# Patient Record
Sex: FEMALE | Race: OTHER
[De-identification: names, ages, dates, MRNs, and addresses within clinical notes are randomized per-mention and may not be internally consistent; named-entity substitution may affect disease eponyms.]

---

## 2017-06-08 NOTE — PDOC
*Physical Exam





- Vital Signs


 Last Vital Signs











Temp Pulse Resp BP Pulse Ox


 


 98.0 F   103 H  20   133/82   100 


 


 06/08/17 21:57  06/08/17 21:57  06/08/17 21:57  06/08/17 21:57  06/08/17 21:57














ED Treatment Course





- ADDITIONAL ORDERS


Additional order review: 


 Laboratory  Results











  06/08/17





  22:10


 


Urine Color  Straw


 


Urine Appearance  Clear


 


Urine pH  6.0


 


Urine Protein  Negative


 


Urine Glucose (UA)  3+ H


 


Urine Ketones  Negative


 


Urine Blood  Negative


 


Urine Nitrite  Negative


 


Urine Bilirubin  Negative


 


Urine Urobilinogen  Negative


 


Ur Leukocyte Esterase  Negative


 


Urine HCG, Qual  Negative














- Medications


Given in the ED: 


ED Medications














Discontinued Medications














Generic Name Dose Route Start Last Admin





  Trade Name Freq  PRN Reason Stop Dose Admin


 


Albuterol/Ipratropium  1 amp 06/08/17 22:23 06/08/17 22:39





  Duoneb -  NEB 06/08/17 22:24  1 amp





  ONCE ONE   Administration


 


Albuterol/Ipratropium  1 amp 06/08/17 23:23 06/08/17 23:25





  Duoneb -  NEB 06/08/17 23:24  1 amp





  ONCE ONE   Administration














Medical Decision Making





- Medical Decision Making





06/08/17 23:32


agree with care from WALTER Machado





*DC/Admit/Observation/Transfer


Diagnosis at time of Disposition: 


 Bronchitis, Asthma exacerbation





- Discharge Dispostion


Disposition: HOME





- Prescriptions


Prescriptions: 


Nebulizer [Aeroeclipse II] 1 each MC Q6H PRN #1 each


 PRN Reason: Cough


Azithromycin 250 mg PO DAILY #4 tablet


Prednisone [Deltasone] 40 mg PO DAILY #6 tablet


Albuterol 0.083% Nebulizer Sol [Ventolin 0.083% Nebulizer Soln -] 1 neb NEB Q6H 

PRN #30 vial


 PRN Reason: Cough





- Referrals


Referrals: 


STAFF,NOT ON [Primary Care Provider] - 





- Patient Instructions


Printed Discharge Instructions:  DI for Acute Bronchitis


Additional Instructions: 


take azithromycin as prescribed





continue albuterol every 6 hours as needed for cough 





take prednisone as prescribed. 





follow up Dr. capone as soon as possible





return to the ER if symptoms worsen. 








- Post Discharge Activity


Work/School Note:  Back to Work

## 2017-06-08 NOTE — PDOC
History of Present Illness





- General


Chief Complaint: Cold Symptoms


Stated Complaint: RESPIRATORY


Time Seen by Provider: 06/08/17 22:11


History Source: Patient





- History of Present Illness


Initial Comments: 





06/08/17 22:24


21 year old female with Cough 3 days and chest pain worse with breathing. 

Patient reports feeling chills has not checked temperature at home. Patient has 

a history of type 1 diabetes, Graves' disease and asthma.





Past History





- Past Medical History


Allergies/Adverse Reactions: 


 Allergies











Allergy/AdvReac Type Severity Reaction Status Date / Time


 


No Known Allergies Allergy   Verified 06/08/17 21:56











Home Medications: 


Ambulatory Orders





Insulin Pump/Infus. Set/Meter [Accu-Chek Combo System] 1 each MC ASDIR 06/08/17 


Levothyroxine [Synthroid -] 125 mcg PO DAILY 06/08/17 


Albuterol 0.083% Nebulizer Sol [Ventolin 0.083% Nebulizer Soln -] 1 neb NEB Q6H 

PRN #30 vial 06/09/17 


Azithromycin 250 mg PO DAILY #4 tablet 06/09/17 


Nebulizer [Aeroeclipse II] 1 each MC Q6H PRN #1 each 06/09/17 


Prednisone [Deltasone] 40 mg PO DAILY #6 tablet 06/09/17 








Diabetes: Yes


Thyroid Disease: Yes (Hypothyroid)





- Psycho/Social/Smoking Cessation Hx


Anxiety: No


Suicidal Ideation: No


Smoking History: Never smoked


Information on smoking cessation initiated: No


Hx Alcohol Use: No


Drug/Substance Use Hx: No


Substance Use Type: None





**Review of Systems





- Review of Systems


Able to Perform ROS?: Yes


Is the patient limited English proficient: No


Constitutional: Yes: Chills


HEENTM: Yes: Nose Congestion.  No: Symptoms Reported, See HPI, Eye Pain, 

Blurred Vision, Tearing, Recent change in vision, Double Vision, Cataracts, Ear 

Pain, Ocular Prothesis, Ear Discharge, Nose Pain, Tinnitus, Nose Bleeding, 

Hearing Loss, Throat Pain, Throat Swelling, Mouth Pain, Dental Problems, 

Difficulty Swallowing, Mouth Swelling, Other


Respiratory: Yes: Cough, Wheezing, Productive cough.  No: Symptoms reported, 

See HPI, Orthopnea, Shortness of Breath, SOB with Exertion, SOB at Rest, Stridor

, Hemoptysis, Other


Neurological: No: Symptoms reported, See HPI, Headache, Numbness, Paresthesia, 

Pre-Existing Deficit, Seizure, Tingling, Tremors, Weakness, Unsteady Gait, 

Ataxia, Dizziness, Other





*Physical Exam





- Vital Signs


 Last Vital Signs











Temp Pulse Resp BP Pulse Ox


 


 98.0 F   103 H  20   133/82   100 


 


 06/08/17 21:57  06/08/17 21:57  06/08/17 21:57  06/08/17 21:57  06/08/17 21:57














- Physical Exam


General Appearance: Yes: Appropriately Dressed (O)


HEENT: positive: Normal ENT Inspection, Other (exophthalmus)


Respiratory/Chest: positive: Rhonchi, Other (coarse breath sounds/ decreased 

breath sounds).  negative: Chest Tender, Lungs Clear, Normal Breath Sounds, 

Respiratory Distress, Accessory Muscle Use, Labored Respiration, Rapid RR, 

Decreased Breath Sounds, Paradoxal Breathing, Crackles, Rales, Stridor, Wheezing

, Hyperresonant, Dullness, Plerual Rub


Cardiovascular: positive: Regular Rhythm, Tachycardia


Gastrointestinal/Abdominal: positive: Normal Bowel Sounds, Soft


Integumentary: positive: Dry, Warm


Neurologic: positive: Fully Oriented, Alert





ED Treatment Course





- RADIOLOGY


Chest X-Ray Result: No Infiltrates





Progress Note





- Progress Note


Progress Note: 


A: RAD vs Penumonia








P: chest xray








duoneb








prednisone








azithromycin





Medical Decision Making





- Medical Decision Making





06/09/17 00:49


patient reports feeling better. encouraged to continue  albuterol As needed for 

cough and wheezing. 





Strict return precautions reviewed with patient. Patient is stable for 

discharge will DC home to have close follow-up with PMD.





*DC/Admit/Observation/Transfer


Diagnosis at time of Disposition: 


 Bronchitis, Asthma exacerbation





- Discharge Dispostion


Disposition: HOME





- Prescriptions


Prescriptions: 


Nebulizer [Aeroeclipse II] 1 each MC Q6H PRN #1 each


 PRN Reason: Cough


Azithromycin 250 mg PO DAILY #4 tablet


Prednisone [Deltasone] 40 mg PO DAILY #6 tablet


Albuterol 0.083% Nebulizer Sol [Ventolin 0.083% Nebulizer Soln -] 1 neb NEB Q6H 

PRN #30 vial


 PRN Reason: Cough





- Referrals


Referrals: 


STAFF,NOT ON [Primary Care Provider] - 





- Patient Instructions


Printed Discharge Instructions:  DI for Acute Bronchitis


Additional Instructions: 


take azithromycin as prescribed





continue albuterol every 6 hours as needed for cough 





take prednisone as prescribed. 





follow up Dr. capone as soon as possible





return to the ER if symptoms worsen. 








- Post Discharge Activity


Work/School Note:  Back to Work

## 2017-09-05 NOTE — PDOC
History of Present Illness





- General


Chief Complaint: Pain


Stated Complaint: LEG INJURY


Time Seen by Provider: 09/05/17 18:44


History Source: Patient


Exam Limitations: No Limitations





- History of Present Illness


Initial Comments: 


09/05/17 19:23


torquihng injury to right lower extremity- caught leg between train and 

platform. Was seen at urgent care, states x-ray was negative for fracture but 

states pain has persisted and in fact feels is mildly worsened. Denies numbness 

or tingling to foot but states pain is severe at site of injury





09/05/17 22:43





Occurred: reports: last week


Severity: reports: mild


Pain Location: reports: lower extremity (right leg)


Method of Injury: Yes: fall


Modifying Factors: improves with: cold therapy


Loss of Consciousness: no loss of consciousness


Associated Symptoms (Fall): denies symptoms





Past History





- Travel


Traveled outside of the country in the last 30 days: No


Close contact w/someone who was outside of country & ill: No





- Past Medical History


Allergies/Adverse Reactions: 


 Allergies











Allergy/AdvReac Type Severity Reaction Status Date / Time


 


No Known Allergies Allergy   Verified 09/05/17 18:40











Home Medications: 


Ambulatory Orders





Insulin Pump/Infus. Set/Meter [Accu-Chek Combo System] 1 each MC ASDIR 06/08/17 


Levothyroxine [Synthroid -] 125 mcg PO DAILY 06/08/17 


Albuterol 0.083% Nebulizer Sol [Ventolin 0.083% Nebulizer Soln -] 1 neb NEB Q6H 

PRN #30 vial 06/09/17 


Nebulizer [Aeroeclipse II] 1 each MC Q6H PRN #1 each 06/09/17 


Bupropion HCl [Bupropion Xl] 150 mg PO ASDIR 09/05/17 


Hydroxyzine HCl 10 mg PO ASDIR 09/05/17 








Diabetes: Yes


Psychiatric Problems:  (depression)


Thyroid Disease: Yes (Hypothyroid, graves disde)





- Psycho/Social/Smoking Cessation Hx


Anxiety: No


Suicidal Ideation: No


Smoking History: Never smoked


Information on smoking cessation initiated: No


Hx Alcohol Use: No


Drug/Substance Use Hx: No


Substance Use Type: None





**Review of Systems





- Review of Systems


Able to Perform ROS?: Yes


Is the patient limited English proficient: Yes


Constitutional: Yes: Symptoms Reported, See HPI, Malaise


HEENTM: No: Symptoms Reported


Musculoskeletal: Yes: Symptoms Reported, See HPI, Joint Swelling, Joint 

Stiffness


Integumentary: Yes: Symptoms Reported, See HPI, Bruising


Neurological: No: Symptoms reported


All Other Systems: Reviewed and Negative





*Physical Exam





- Vital Signs


 Last Vital Signs











Temp Pulse Resp BP Pulse Ox


 


 98.1 F   98 H  18   103/76   99 


 


 09/05/17 18:38  09/05/17 18:38  09/05/17 18:38  09/05/17 18:38  09/05/17 18:38














- Physical Exam


General Appearance: Yes: Nourished, Appropriately Dressed, Apparent Distress


HEENT: positive: BLAKE, Normal ENT Inspection, TMs Normal, Pharynx Normal


Neck: positive: Supple.  negative: Tender


Respiratory/Chest: positive: Lungs Clear


Musculoskeletal: positive: Normal Inspection, Decreased Range of Motion


Extremity: positive: Normal Capillary Refill, Tender, Swelling (healing 

ecchymosis).  negative: Normal Range of Motion (limitied due to swelling and 

pain )


Integumentary: positive: Normal Color, Dry, Warm


Neurologic: positive: CNs II-XII NML intact, Fully Oriented, Alert, Normal Mood/

Affect, Normal Response, Motor Strength 5/5





Progress Note





- Progress Note


Progress Note: 


CT negative for fractures dislocation, Ace wrap applied, given crutches and 

encouraged to avoid strenuous activity or weightbearing until hematomas 

resolved. Orth O as needed





*DC/Admit/Observation/Transfer


Diagnosis at time of Disposition: 


Contusion


Qualifiers:


 Encounter type: initial encounter Contusion area: lower leg Laterality: left 

Qualified Code(s): S80.12XA - Contusion of left lower leg, initial encounter





- Discharge Dispostion


Disposition: HOME


Condition at time of disposition: Stable


Admit: No





- Referrals


Referrals: 


STAFF,NOT ON [Primary Care Provider] - 


Justino Matias MD [Staff Physician] - 





- Patient Instructions


Printed Discharge Instructions:  DI for Contusion


Additional Instructions: 


Rest, ice to area on and off for 15 minutes 4-6 times a day


Avoid heavy lifting or exercise until pain and swelling is resolved or until 

further directed


Keep area highly elevated to reduce swelling


Use splints/Ace wrap as directed


Followup with orthopedist in one to 2 days if not improving, 


    if significantly improved may wait one week for followup with orthopedist





May use ibuprofen 2-200 mg tablets every 6 hours as needed for pain





- Post Discharge Activity


Work/School Note:  Back to Work

## 2018-04-30 ENCOUNTER — APPOINTMENT (OUTPATIENT)
Dept: ENDOCRINOLOGY | Facility: CLINIC | Age: 23
End: 2018-04-30

## 2018-05-09 ENCOUNTER — HOSPITAL ENCOUNTER (EMERGENCY)
Dept: HOSPITAL 74 - JER | Age: 23
Discharge: HOME | End: 2018-05-09
Payer: COMMERCIAL

## 2018-05-09 VITALS — TEMPERATURE: 98.3 F | HEART RATE: 83 BPM | SYSTOLIC BLOOD PRESSURE: 131 MMHG | DIASTOLIC BLOOD PRESSURE: 77 MMHG

## 2018-05-09 VITALS — BODY MASS INDEX: 26.9 KG/M2

## 2018-05-09 DIAGNOSIS — F32.9: ICD-10-CM

## 2018-05-09 DIAGNOSIS — E10.9: ICD-10-CM

## 2018-05-09 DIAGNOSIS — R63.4: ICD-10-CM

## 2018-05-09 DIAGNOSIS — E05.00: ICD-10-CM

## 2018-05-09 DIAGNOSIS — Z79.4: ICD-10-CM

## 2018-05-09 DIAGNOSIS — E89.0: ICD-10-CM

## 2018-05-09 DIAGNOSIS — Z96.41: ICD-10-CM

## 2018-05-09 DIAGNOSIS — R10.13: Primary | ICD-10-CM

## 2018-05-09 LAB
ALBUMIN SERPL-MCNC: 4.1 G/DL (ref 3.4–5)
ALP SERPL-CCNC: 57 U/L (ref 45–117)
ALT SERPL-CCNC: 12 U/L (ref 12–78)
ANION GAP SERPL CALC-SCNC: 7 MMOL/L (ref 8–16)
ANISOCYTOSIS BLD QL: (no result)
APPEARANCE UR: CLEAR
AST SERPL-CCNC: 9 U/L (ref 15–37)
BASOPHILS # BLD: 0.4 % (ref 0–2)
BILIRUB SERPL-MCNC: 0.5 MG/DL (ref 0.2–1)
BILIRUB UR STRIP.AUTO-MCNC: NEGATIVE MG/DL
BUN SERPL-MCNC: 11 MG/DL (ref 7–18)
CALCIUM SERPL-MCNC: 8.5 MG/DL (ref 8.5–10.1)
CHLORIDE SERPL-SCNC: 102 MMOL/L (ref 98–107)
CO2 SERPL-SCNC: 25 MMOL/L (ref 21–32)
COLOR UR: (no result)
CREAT SERPL-MCNC: 0.7 MG/DL (ref 0.55–1.02)
DEPRECATED RDW RBC AUTO: 14.8 % (ref 11.6–15.6)
EOSINOPHIL # BLD: 0.7 % (ref 0–4.5)
EPITH CASTS URNS QL MICRO: (no result) /HPF
GLUCOSE SERPL-MCNC: 375 MG/DL (ref 74–106)
HCT VFR BLD CALC: 36.3 % (ref 32.4–45.2)
HGB BLD-MCNC: 11.5 GM/DL (ref 10.7–15.3)
KETONES UR QL STRIP: (no result)
LEUKOCYTE ESTERASE UR QL STRIP.AUTO: (no result)
LIPASE SERPL-CCNC: 65 U/L (ref 73–393)
LYMPHOCYTES # BLD: 24.1 % (ref 8–40)
MACROCYTES BLD QL: (no result)
MCH RBC QN AUTO: 20.1 PG (ref 25.7–33.7)
MCHC RBC AUTO-ENTMCNC: 31.5 G/DL (ref 32–36)
MCV RBC: 63.6 FL (ref 80–96)
MONOCYTES # BLD AUTO: 7.8 % (ref 3.8–10.2)
NEUTROPHILS # BLD: 67 % (ref 42.8–82.8)
NITRITE UR QL STRIP: NEGATIVE
PH UR: 6 [PH] (ref 5–8)
PLATELET # BLD AUTO: 225 K/MM3 (ref 134–434)
PLATELET BLD QL SMEAR: ADEQUATE
PMV BLD: 9.8 FL (ref 7.5–11.1)
POTASSIUM SERPLBLD-SCNC: 4.1 MMOL/L (ref 3.5–5.1)
PROT SERPL-MCNC: 7.5 G/DL (ref 6.4–8.2)
PROT UR QL STRIP: (no result)
PROT UR QL STRIP: NEGATIVE
RBC # BLD AUTO: 5.71 M/MM3 (ref 3.6–5.2)
RBC MORPH BLD: YES
SODIUM SERPL-SCNC: 134 MMOL/L (ref 136–145)
SP GR UR: 1.03 (ref 1–1.03)
UROBILINOGEN UR STRIP-MCNC: NEGATIVE MG/DL (ref 0.2–1)
WBC # BLD AUTO: 7.9 K/MM3 (ref 4–10)

## 2018-05-09 NOTE — PDOC
Rapid Medical Evaluation


Time Seen by Provider: 05/09/18 16:02


Medical Evaluation: 


 Allergies











Allergy/AdvReac Type Severity Reaction Status Date / Time


 


No Known Allergies Allergy   Verified 05/09/18 16:01











05/09/18 16:02


I have performed a brief in-person evaluation of this patient.





The patient presents with a chief complaint of: upper abd pain w/ n/v x several 

months, getting worse now. H/o IDDM, Graves disease, s/p surgery, on 

levothyroxine





Pertinent physical exam findings:well maylin w/ ttp to epigastrium





I have ordered the following:labs





The patient will proceed to the ED for further evaluation.





**Discharge Disposition





- Diagnosis


Abdominal pain


Qualifiers:


 Abdominal location: epigastric Qualified Code(s): R10.13 - Epigastric pain








- Referrals


Referrals: 


Ramona Mcallister MD [Primary Care Provider] - 





- Patient Instructions





- Post Discharge Activity

## 2018-05-09 NOTE — PDOC
History of Present Illness





- General


History Source: Patient


Exam Limitations: No Limitations





- History of Present Illness


Initial Comments: 





05/09/18 18:28


The patient is a 22 year old female with past medical history of IDDM and 

Graves disease s/p thyroidectomy who presents to the ED with complaints of 

abdominal pain with nausea,  vomiting, and diarrhea since December. The patient 

states she experiences a centralized abdominal pain almost daily, that can 

occur either before or after she eats. She is unable to notice any pattern with 

her pain in regards to what she is eating. She states the pain is always 

associated with nausea and intermittently with vomiting and diarrhea. The 

patient reports that since yesterday she has had multiple episodes of nonbloody/

nonbilious emesis and diarrhea. She also reports having a 9 lb weight loss 

since this these symptoms began as well because she hasnt been eating as much 

to avoid pain. She denies any blood in her stool or urine. Denies any recent 

illness, fevers or chills. She reports she has an appointment with a GI 

specialist at the end of June. She reports her synthroid medication was changed 

two months ago. 








<Theodora Hoffman - Last Filed: 05/09/18 19:48>





<Annemarie Vallecillo - Last Filed: 05/09/18 20:33>





- General


Chief Complaint: Pain, Acute


Stated Complaint: ABD PAIN, NAUSEA


Time Seen by Provider: 05/09/18 16:02





Past History





<Theodora Hoffman - Last Filed: 05/09/18 19:48>





- Past Medical History


COPD: No


Diabetes: Yes (TYPE 1)


Psychiatric Problems:  (depression)


Thyroid Disease: Yes (Hypothyroid)





- Suicide/Smoking/Psychosocial Hx


Smoking History: Never smoked


Hx Alcohol Use: No


Drug/Substance Use Hx: No


Substance Use Type: None





<Annemarie Vallecillo - Last Filed: 05/09/18 20:33>





- Past Medical History


Allergies/Adverse Reactions: 


 Allergies











Allergy/AdvReac Type Severity Reaction Status Date / Time


 


No Known Allergies Allergy   Verified 05/09/18 16:01











Home Medications: 


Ambulatory Orders





Insulin Pump/Infus. Set/Meter [Accu-Chek Combo System] 1 each MC ASDIR 06/08/17 


Levothyroxine [Synthroid -] 200 mcg PO DAILY 06/08/17 











**Review of Systems





- Review of Systems


Able to Perform ROS?: Yes


Comments:: 





05/09/18 18:29


GENERAL/CONSTITUTIONAL: No fever or chills. No weakness.


HEAD, EYES, EARS, NOSE AND THROAT: No change in vision. No ear pain or 

discharge. No sore throat.


CARDIOVASCULAR: No chest pain or shortness of breath.


RESPIRATORY: No cough, wheezing, or hemoptysis.


GASTROINTESTINAL: (+) nausea, vomiting, diarrhea, abdominal pain. No 

constipation.


GENITOURINARY: No dysuria, frequency, or change in urination.


MUSCULOSKELETAL: No joint or muscle swelling or pain. No neck or back pain.


SKIN: No rash


NEUROLOGIC: No headache, vertigo, loss of consciousness, or change in strength/

sensation.


ENDOCRINE: (+) 9 lb weight loss. No increased thirst.


HEMATOLOGIC/LYMPHATIC: No anemia, easy bleeding, or history of blood clots.


ALLERGIC/IMMUNOLOGIC: No hives or skin allergy.





All Other Systems: Reviewed and Negative





<Theodora Hoffman - Last Filed: 05/09/18 19:48>





*Physical Exam





- Vital Signs


 Last Vital Signs











Temp Pulse Resp BP Pulse Ox


 


 98.5 F   82   16   123/77   99 


 


 05/09/18 16:01  05/09/18 16:01  05/09/18 16:01  05/09/18 16:01  05/09/18 16:01














- Physical Exam


Comments: 





05/09/18 18:30


GENERAL: Awake, alert, and fully oriented, in no acute distress


HEAD: No signs of trauma


EYES: PERRLA, EOMI, sclera anicteric, conjunctiva clear


ENT: Auricles normal inspection, hearing grossly normal, nares patent, 

oropharynx clear without 


exudates. Moist mucosa


NECK: Normal ROM, supple, no lymphadenopathy, JVD, or masses


LUNGS: Breath sounds equal, clear to auscultation bilaterally.  No wheezes, and 

no crackles


HEART: Regular rate and rhythm, normal S1 and S2, no murmurs, rubs or gallops


ABDOMEN: Epigastric tenderness. Soft, normoactive bowel sounds.  No guarding, 

no rebound.  No masses


EXTREMITIES: Normal range of motion, no edema.  No clubbing or cyanosis. No 

cords, erythema, or tenderness


NEUROLOGICAL: Cranial nerves II through XII grossly intact.  Normal speech, 

normal gait


SKIN: Warm, Dry, normal turgor, no rashes or lesions noted.








<Theodora Hoffman - Last Filed: 05/09/18 19:48>





- Vital Signs


 Last Vital Signs











Temp Pulse Resp BP Pulse Ox


 


 98.5 F   82   16   123/77   99 


 


 05/09/18 16:01  05/09/18 16:01  05/09/18 16:01  05/09/18 16:01  05/09/18 16:01














<Annemarie Vallecillo - Last Filed: 05/09/18 20:33>





ED Treatment Course





- LABORATORY


CBC & Chemistry Diagram: 


 05/09/18 16:34





 05/09/18 16:34





- ADDITIONAL ORDERS


Additional order review: 


 Laboratory  Results











  05/09/18 05/09/18 05/09/18





  17:07 16:34 16:34


 


Sodium   


 


Potassium   


 


Chloride   


 


Carbon Dioxide   


 


Anion Gap   


 


BUN   


 


Creatinine   


 


Creat Clearance w eGFR   


 


Random Glucose   


 


Calcium   


 


Total Bilirubin   


 


AST   


 


ALT   


 


Alkaline Phosphatase   


 


Total Protein   


 


Albumin   


 


Lipase   


 


TSH   0.01 L 


 


Serum Pregnancy, Qual    Negative


 


Urine Color  Straw  


 


Urine Appearance  Clear  


 


Urine pH  6.0  


 


Ur Specific Gravity  1.028  


 


Urine Protein  Negative  


 


Urine Glucose (UA)  3+ H  


 


Urine Ketones  Trace H  


 


Urine Blood  Negative  


 


Urine Nitrite  Negative  


 


Urine Bilirubin  Negative  


 


Urine Urobilinogen  Negative  


 


Ur Leukocyte Esterase  Trace  


 


Urine WBC (Auto)  6  


 


Urine RBC (Auto)  5  


 


Ur Epithelial Cells  Rare  














  05/09/18





  16:34


 


Sodium  134 L


 


Potassium  4.1


 


Chloride  102


 


Carbon Dioxide  25


 


Anion Gap  7 L


 


BUN  11


 


Creatinine  0.7


 


Creat Clearance w eGFR  > 60


 


Random Glucose  375 H*


 


Calcium  8.5


 


Total Bilirubin  0.5  D


 


AST  9 L


 


ALT  12


 


Alkaline Phosphatase  57


 


Total Protein  7.5


 


Albumin  4.1


 


Lipase  65 L


 


TSH 


 


Serum Pregnancy, Qual 


 


Urine Color 


 


Urine Appearance 


 


Urine pH 


 


Ur Specific Gravity 


 


Urine Protein 


 


Urine Glucose (UA) 


 


Urine Ketones 


 


Urine Blood 


 


Urine Nitrite 


 


Urine Bilirubin 


 


Urine Urobilinogen 


 


Ur Leukocyte Esterase 


 


Urine WBC (Auto) 


 


Urine RBC (Auto) 


 


Ur Epithelial Cells 








 











  05/09/18





  16:34


 


RBC  5.71 H


 


MCV  63.6 L


 


MCHC  31.5 L


 


RDW  14.8


 


MPV  9.8  D


 


Neutrophils %  67.0  D


 


Lymphocytes %  24.1  D


 


Monocytes %  7.8


 


Eosinophils %  0.7


 


Basophils %  0.4














- RADIOLOGY


Radiograph Interpretation: 





05/09/18 19:48


Abdominal ultrasound as reviewed by Dr. Atwood reports no gallstones identified. 





- Medications


Given in the ED: 


ED Medications














Discontinued Medications














Generic Name Dose Route Start Last Admin





  Trade Name Cathi  PRN Reason Stop Dose Admin


 


Al Hydroxide/Mg Hydroxide  30 ml  05/09/18 17:39  05/09/18 17:53





  Mylanta Oral Suspension -  PO  05/09/18 17:40  30 ml





  ONCE ONE   Administration


 


Ondansetron HCl  4 mg  05/09/18 17:39  05/09/18 17:53





  Zofran Odt -  SL  05/09/18 17:40  4 mg





  ONCE ONE   Administration


 


Ranitidine HCl  150 mg  05/09/18 17:39  05/09/18 17:53





  Zantac -  PO  05/09/18 17:40  150 mg





  ONCE ONE   Administration














<Theodora Hoffman - Last Filed: 05/09/18 19:48>





- LABORATORY


CBC & Chemistry Diagram: 


 05/09/18 16:34





 05/09/18 16:34





- ADDITIONAL ORDERS


Additional order review: 


 











  05/09/18





  16:34


 


RBC  5.71 H


 


MCV  63.6 L


 


MCHC  31.5 L


 


RDW  14.8


 


MPV  9.8  D


 


Neutrophils %  67.0  D


 


Lymphocytes %  24.1  D


 


Monocytes %  7.8


 


Eosinophils %  0.7


 


Basophils %  0.4














<Annemarie Vallecillo - Last Filed: 05/09/18 20:33>





Medical Decision Making





- Medical Decision Making





05/09/18 17:39


a/p: 23yo female with epigastric pain that radiates to back x 5 months


-seen at urgent care x 2, told to follow up with GI and has appt with a GI 

specialist in the [place] for the end of june


-pain sometimes assoc with eating


-has had outpt labs that are normal


-will obtain labs, GI coctail for meds, RUQ u/s to eval for poss biliary disease


-will monitor and reassess


05/09/18 20:28


ultrasound negative for gallstones


labs reviewed


pt states her thyroid meds were changed recently 


discussed decreasing her synthroid dose


05/09/18 20:30


states she uses an insulin pump to control her glucose


does not follow up with an endocrinologist


05/09/18 20:31


discussed all reasons to return to the ED and need for follow up with Gi and 

endocrinology as an outpt


answered all quesitons





<Annemarie Vallecillo - Last Filed: 05/09/18 20:33>





*DC/Admit/Observation/Transfer





- Attestations


Scribe Attestion: 





05/09/18 18:31


Documentation prepared by Theodora Hoffman, acting as medical scribe for Annemarie Vallecillo DO.





<Theodora Hoffman - Last Filed: 05/09/18 19:48>





- Discharge Dispostion


Decision to Admit order: No





- Attestations


Physician Attestion: 





05/09/18 20:33








I, Dr. Annemarie Vallecillo DO, attest that this document has been prepared under 

my direction and personally reviewed by me in its entirety.   I further attest, 

that it accurately reflects all work, treatment, procedures and medical decision

-making performed by me.  





<Annemarie Vallecillo - Last Filed: 05/09/18 20:33>


Diagnosis at time of Disposition: 


 Epigastric pain, Hyperthyroidism





Abdominal pain


Qualifiers:


 Abdominal location: epigastric Qualified Code(s): R10.13 - Epigastric pain








- Discharge Dispostion


Disposition: HOME


Condition at time of disposition: Stable





- Referrals


Referrals: 


Ramona Mcallister MD [Primary Care Provider] - 


Gerber Kruse MD [Staff Physician] - 


John Bond MD [Staff Physician] - 





- Patient Instructions


Printed Discharge Instructions:  DI for Hyperthyroidism, DI for Epigastric Pain


Additional Instructions: 


Please make an appointment with the endocrinologist and the gastroenterologist. 

Please return to the ED with any further concerns or complaints.





- Post Discharge Activity

## 2018-05-20 ENCOUNTER — HOSPITAL ENCOUNTER (EMERGENCY)
Dept: HOSPITAL 74 - JER | Age: 23
LOS: 1 days | Discharge: HOME | End: 2018-05-21
Payer: COMMERCIAL

## 2018-05-20 VITALS — DIASTOLIC BLOOD PRESSURE: 62 MMHG | SYSTOLIC BLOOD PRESSURE: 134 MMHG | TEMPERATURE: 100.1 F | HEART RATE: 122 BPM

## 2018-05-20 VITALS — BODY MASS INDEX: 27.3 KG/M2

## 2018-05-20 DIAGNOSIS — E05.00: ICD-10-CM

## 2018-05-20 DIAGNOSIS — Z96.41: ICD-10-CM

## 2018-05-20 DIAGNOSIS — E89.0: ICD-10-CM

## 2018-05-20 DIAGNOSIS — Z79.4: ICD-10-CM

## 2018-05-20 DIAGNOSIS — R10.13: Primary | ICD-10-CM

## 2018-05-20 DIAGNOSIS — E10.9: ICD-10-CM

## 2018-05-20 PROCEDURE — 3E033NZ INTRODUCTION OF ANALGESICS, HYPNOTICS, SEDATIVES INTO PERIPHERAL VEIN, PERCUTANEOUS APPROACH: ICD-10-PCS

## 2018-05-20 PROCEDURE — 3E033GC INTRODUCTION OF OTHER THERAPEUTIC SUBSTANCE INTO PERIPHERAL VEIN, PERCUTANEOUS APPROACH: ICD-10-PCS

## 2018-05-21 LAB
ALBUMIN SERPL-MCNC: 3.6 G/DL (ref 3.4–5)
ALP SERPL-CCNC: 63 U/L (ref 45–117)
ALT SERPL-CCNC: 14 U/L (ref 12–78)
ANION GAP SERPL CALC-SCNC: 9 MMOL/L (ref 8–16)
APPEARANCE UR: CLEAR
AST SERPL-CCNC: 14 U/L (ref 15–37)
BASOPHILS # BLD: 0.3 % (ref 0–2)
BILIRUB SERPL-MCNC: 0.6 MG/DL (ref 0.2–1)
BILIRUB UR STRIP.AUTO-MCNC: NEGATIVE MG/DL
BUN SERPL-MCNC: 13 MG/DL (ref 7–18)
CALCIUM SERPL-MCNC: 8 MG/DL (ref 8.5–10.1)
CHLORIDE SERPL-SCNC: 103 MMOL/L (ref 98–107)
CO2 SERPL-SCNC: 24 MMOL/L (ref 21–32)
COLOR UR: YELLOW
CREAT SERPL-MCNC: 0.7 MG/DL (ref 0.55–1.02)
DEPRECATED RDW RBC AUTO: 14.6 % (ref 11.6–15.6)
EOSINOPHIL # BLD: 0.1 % (ref 0–4.5)
EPITH CASTS URNS QL MICRO: (no result) /HPF
GLUCOSE SERPL-MCNC: 182 MG/DL (ref 74–106)
HCT VFR BLD CALC: 33.8 % (ref 32.4–45.2)
HGB BLD-MCNC: 10.5 GM/DL (ref 10.7–15.3)
HYALINE CASTS URNS QL MICRO: 1 /LPF
KETONES UR QL STRIP: (no result)
LEUKOCYTE ESTERASE UR QL STRIP.AUTO: NEGATIVE
LIPASE SERPL-CCNC: 32 U/L (ref 73–393)
LYMPHOCYTES # BLD: 18.5 % (ref 8–40)
MAGNESIUM SERPL-MCNC: 1.9 MG/DL (ref 1.8–2.4)
MCH RBC QN AUTO: 19.8 PG (ref 25.7–33.7)
MCHC RBC AUTO-ENTMCNC: 31 G/DL (ref 32–36)
MCV RBC: 63.7 FL (ref 80–96)
MONOCYTES # BLD AUTO: 9.3 % (ref 3.8–10.2)
MUCOUS THREADS URNS QL MICRO: (no result)
NEUTROPHILS # BLD: 71.8 % (ref 42.8–82.8)
NITRITE UR QL STRIP: NEGATIVE
PH BLDV: 7.39 [PH] (ref 7.32–7.42)
PH UR: 5 [PH] (ref 5–8)
PLATELET # BLD AUTO: 251 K/MM3 (ref 134–434)
PMV BLD: 9.9 FL (ref 7.5–11.1)
POTASSIUM SERPLBLD-SCNC: 3.9 MMOL/L (ref 3.5–5.1)
PROT SERPL-MCNC: 7.3 G/DL (ref 6.4–8.2)
PROT UR QL STRIP: (no result)
PROT UR QL STRIP: (no result)
RBC # BLD AUTO: 5.31 M/MM3 (ref 3.6–5.2)
SODIUM SERPL-SCNC: 136 MMOL/L (ref 136–145)
SP GR UR: 1.03 (ref 1–1.03)
UROBILINOGEN UR STRIP-MCNC: 2 MG/DL (ref 0.2–1)
VENOUS PC02: 39.9 MMHG (ref 38–52)
VENOUS PO2: 41.7 MMHG (ref 28–48)
WBC # BLD AUTO: 6.5 K/MM3 (ref 4–10)

## 2018-05-21 NOTE — PDOC
Attending Attestation





- Resident


Resident Name: Mg Villa





- ED Attending Attestation


I have performed the following: I have examined & evaluated the patient, The 

case was reviewed & discussed with the resident, I agree w/resident's findings 

& plan, Exceptions are as noted





- HPI


HPI: 





05/21/18 05:02


The patient is a 22 year old female, with a significant past medical history of 

Type 1 Diabetes (on insulin pump) and Graves Disease (s/p thyroidectomy on 

synthroid), who presents to the emergency department with 2 days of epigastric 

pain and PO intolerance. As per patient, she had fever tonight of 101.8 degrees 

F which prompted her to come into the ED. She is currently on Amoxicillin for a 

sinus infection for a sinus infection (x2days). She reports not being able to 

withhold food or drink for the past 2 days.  As per patient, she came into the 

ED for similar symptoms recently (05/9/18) and was given a referral for a GI 

doctor after a negative ultrasound. Reports her blood sugars have been in the 

300s which often occurs when she has an infection. Denies hx DKA. 





She denies recent headache or dizziness. She denies recent  dysuria, frequency, 

urgency or hematuria. She denies recent chest pain or shortness of breath.





Allergies: NKA 


Past surgical history: Thyroidectomy. 


Social history: Nonsmoker. Denies EtOH use and recreational drug use. 


Primary Care Physician: Dr. Mcallister








- Physicial Exam


PE: 





05/21/18 05:02


GENERAL: Awake, alert, and fully oriented, in no acute distress


HEAD: No signs of trauma


EYES: PERRLA, EOMI, sclera anicteric, conjunctiva clear. +mild proptosis


ENT: Auricles normal inspection, hearing grossly normal, nares patent, 

oropharynx clear without exudates. Moist mucosa


NECK: Normal ROM, supple, no lymphadenopathy, JVD, or masses


LUNGS: Breath sounds equal, clear to auscultation bilaterally.  No wheezes, and 

no crackles


HEART: tachy to 110 but regular, normal S1 and S2, no murmurs, rubs or gallops


ABDOMEN: Soft, +epigastric ttp, normoactive bowel sounds.  No guarding, no 

rebound.  No masses


EXTREMITIES: Normal range of motion, no edema.  No clubbing or cyanosis. No 

cords, erythema, or tenderness


BACK: No midline spinal tenderness in cervical/thoracic/lumbar region


NEUROLOGICAL:  Normal speech, cranial nerves intact, negative pronator drift, 5/

5 strength in all 4 extremities, normal sensation to light touch in all 4 

extremities, normal cerebellar exam, normal gait, normal reflexes and tone


SKIN: Warm, Dry, normal turgor, no rashes or lesions noted.








- Medical Decision Making





05/21/18 05:02


21yo F hx DM1, graves disease presents with epigastric pain, PO intolerance and 

fever to 101. VS with fever and tachycardia. Exam with epigastric ttp. Pt had 

neg US recently in ED for similar sxs. DDx includes but not limited to 

enteritis vs pancreatitis vs pyelonephritis vs cystitis vs colitis vs 

gastritis. DKA a thought, but unlikely with FS in 300s. Unlikely cholecystitis 

as US recently with no gallstones. Plan:


-labs


-UA


-UPT


-CTAP w IV contrast


-antipyretics, pain control


-reassess





05/21/18 07:03


Labs with no leukocytosis


UA neg


TSH undetectable, free T4 very mildly elevated. 


Repeat HR 92


Pt does not appear to be frankly hyperthyroid at this time


Rpt abd exam benign with minimal epigastric ttp


Pt tolerating PO, is clinically well appearing


Pt was offered CTAP given bounceback visit but declines at this time as she 

feels much better


Pt made aware that she should return ASAP if she has any return of the pain or 

any concerning symptoms for likey CTAP


She expresses understanding. 





I discussed the physical exam findings, ancillary test results and final 

diagnoses with the patient. I answered all of the patient's questions. The 

patient was satisfied with the care received and felt comfortable with the 

discharge plan and treatment plan. The patient will call their primary care 

physician within 24 hours to arrange follow-up and will return to the Emergency 

Department with any new, persistent or worsening symptoms. 





<Sherly Torres - Last Filed: 05/21/18 07:22>





Attestations





- Attestations





05/21/18 05:14





Documentation prepared by Maury Cutler, acting as medical scribe for 

Sherly Torres MD.





<Maury Cutler - Last Filed: 05/21/18 05:14>

## 2018-05-21 NOTE — PDOC
History of Present Illness





- General


Chief Complaint: Pain


Stated Complaint: VOMITING/DIARRHEA


Time Seen by Provider: 05/21/18 03:54





- History of Present Illness


Initial Comments: 





05/21/18 04:05


Ms. Myles is a 23 yo female w/ pmh of graves disease s/p thyroidectomy and 

IDDM who presents for evaluation of 2 day history of epigastric pain. She has 

previously been evaluated in this ER for similar pain 5/9/18 with negative 

abdominal US and has appointment for follow-up with GI in mid July. Ms. Myles 

reports she has also had fever for the last day despite being on ABX (

amoxicillin) for a sinus infection. ABX proscribed Saturday at outpatient 

urgent care clinic. She also endorses a 2 day history of being unable to keep 

anything down including soup or tylenol.





The patient denies chest pain, shortness of breath, headache and dizziness. 

Denies fever, chills, diarrhea and constipation. Denies dysuria, frequency, 

urgency and hematuria. 





Allergies: NKDA





05/21/18 04:27








Past History





- Past Medical History


Allergies/Adverse Reactions: 


 Allergies











Allergy/AdvReac Type Severity Reaction Status Date / Time


 


No Known Allergies Allergy   Verified 05/20/18 23:08











Home Medications: 


Ambulatory Orders





Insulin Pump/Infus. Set/Meter [Accu-Chek Combo System] 1 each [facility] ASDIR 06/08/17 


Levothyroxine [Synthroid -] 200 mcg PO DAILY 06/08/17 








COPD: No


Diabetes: Yes (TYPE 1)


Psychiatric Problems:  (depression)


Thyroid Disease: Yes (Hypothyroid)





- Reproductive History


Cervical CA: No


Dysfunctional Uterine Bleeding: No


Ectopic Pregnancy: No


Endometrial CA: No


Polycystic Ovaries: No





- Suicide/Smoking/Psychosocial Hx


Smoking History: Never smoked


Hx Alcohol Use: No


Drug/Substance Use Hx: No


Substance Use Type: None





**Review of Systems





- Review of Systems


Comments:: 





05/21/18 04:22


GENERAL/CONSTITUTIONAL: No fever or chills. No weakness.


HEAD, EYES, EARS, NOSE AND THROAT: No change in vision. No ear pain or 

discharge. No sore throat.


CARDIOVASCULAR: No chest pain or shortness of breath


RESPIRATORY: No cough, wheezing, or hemoptysis.


GASTROINTESTINAL: +N/V as described with epigastric pain. No diarrhea or 

constipation.


GENITOURINARY: No dysuria, frequency, or change in urination.


MUSCULOSKELETAL: No joint or muscle swelling or pain. No neck or back pain.


SKIN: No rash


NEUROLOGIC: No headache, vertigo, loss of consciousness, or change in strength/

sensation.


ENDOCRINE: No increased thirst. No abnormal weight change


HEMATOLOGIC/LYMPHATIC: No anemia, easy bleeding, or history of blood clots.


ALLERGIC/IMMUNOLOGIC: No hives or skin allergy.





*Physical Exam





- Vital Signs


 Last Vital Signs











Temp Pulse Resp BP Pulse Ox


 


 100.1 F H  122 H  18   134/62   97 


 


 05/20/18 23:06  05/20/18 23:06  05/20/18 23:06  05/20/18 23:06  05/20/18 23:06














- Physical Exam


Comments: 





05/21/18 04:22


GENERAL: Awake, alert, and fully oriented, in no acute distress


HEAD: No signs of trauma, normocephalic, atraumatic 


EYES: PERRLA, EOMI, sclera anicteric, conjunctiva clear


ENT: Auricles normal inspection, hearing grossly normal, nares patent, 

oropharynx clear without


exudates. Moist mucosa


NECK: Normal ROM, supple, no lymphadenopathy, JVD, or masses


LUNGS: No distress, speaks full sentences, clear to auscultation bilaterally 


HEART: Regular rate and rhythm, normal S1 and S2, no murmurs, rubs or gallops, 

peripheral pulses normal and equal bilaterally. 


ABDOMEN: +Midline epigastric TTP. Soft, normoactive bowel sounds. No guarding, 

no rebound. No masses


EXTREMITIES: Normal inspection, Normal range of motion, no edema. No clubbing 

or cyanosis. 


NEUROLOGICAL: Cranial nerves II through XII grossly intact. Normal speech, 

normal gait, no focal sensorimotor deficits 


SKIN: Warm, Dry, normal turgor, no rashes or lesions noted. 











ED Treatment Course





- LABORATORY


CBC & Chemistry Diagram: 


 05/21/18 05:50





 05/21/18 04:28





Medical Decision Making





- Medical Decision Making





05/21/18 06:41


Ms. Myles is a 23 yo female w/ pmh as described who presents for evaluation of 

abdominal pain. Due to repeat nature of patient visit, broad workup begun 

including tylenol, pepcid, zofran, and fluids for symptomatic relief.





05/21/18 06:59


Patient reporting resolution of symptoms with above treatment. Labs below 

notable for elevated T4 and low TSH. Discussed following up with 

endocrinologist for adjustment of levothyroxine. Patient verbalized 

understanding and agreement and will comply. Discharging patient with 

instructions to f/u with PCP this week for further evaluation.





 Laboratory Results - last 24 hr











  05/21/18 05/21/18 05/21/18





  04:20 04:28 04:28


 


WBC   


 


RBC   


 


Hgb   


 


Hct   


 


MCV   


 


MCH   


 


MCHC   


 


RDW   


 


Plt Count   


 


MPV   


 


Neutrophils %   


 


Lymphocytes %   


 


Monocytes %   


 


Eosinophils %   


 


Basophils %   


 


VBG pH  7.39  


 


POC VBG pCO2  39.9  


 


POC VBG pO2  41.7  


 


Mixed VBG HCO3  23.5  


 


Sodium    136


 


Potassium    3.9


 


Chloride    103


 


Carbon Dioxide    24


 


Anion Gap    9


 


BUN    13


 


Creatinine    0.7


 


Creat Clearance w eGFR    > 60


 


Random Glucose    182 H


 


Calcium    8.0 L


 


Magnesium    1.9


 


Total Bilirubin    0.6


 


AST    14 L


 


ALT    14


 


Alkaline Phosphatase    63


 


Troponin I    < 0.02


 


Total Protein    7.3


 


Albumin    3.6


 


Lipase    32 L


 


TSH    < 0.01 L


 


Free T4   


 


Urine Color   Yellow 


 


Urine Appearance   Clear 


 


Urine pH   5.0 


 


Ur Specific Gravity   1.031 


 


Urine Protein   1+ H 


 


Urine Glucose (UA)   3+ H 


 


Urine Ketones   2+ H 


 


Urine Blood   Negative 


 


Urine Nitrite   Negative 


 


Urine Bilirubin   Negative 


 


Urine Urobilinogen   2.0 H 


 


Ur Leukocyte Esterase   Negative 


 


Urine WBC (Auto)   3 


 


Urine RBC (Auto)   2 


 


Ur Epithelial Cells   Rare 


 


Hyaline Casts   1 


 


Urine Mucus   Rare 


 


Urine HCG, Qual   


 


Acetone, Qual   














  05/21/18 05/21/18 05/21/18





  04:28 04:36 04:36


 


WBC   


 


RBC   


 


Hgb   


 


Hct   


 


MCV   


 


MCH   


 


MCHC   


 


RDW   


 


Plt Count   


 


MPV   


 


Neutrophils %   


 


Lymphocytes %   


 


Monocytes %   


 


Eosinophils %   


 


Basophils %   


 


VBG pH   


 


POC VBG pCO2   


 


POC VBG pO2   


 


Mixed VBG HCO3   


 


Sodium   


 


Potassium   


 


Chloride   


 


Carbon Dioxide   


 


Anion Gap   


 


BUN   


 


Creatinine   


 


Creat Clearance w eGFR   


 


Random Glucose   


 


Calcium   


 


Magnesium   


 


Total Bilirubin   


 


AST   


 


ALT   


 


Alkaline Phosphatase   


 


Troponin I   


 


Total Protein   


 


Albumin   


 


Lipase   


 


TSH   


 


Free T4   1.74 H 


 


Urine Color   


 


Urine Appearance   


 


Urine pH   


 


Ur Specific Gravity   


 


Urine Protein   


 


Urine Glucose (UA)   


 


Urine Ketones   


 


Urine Blood   


 


Urine Nitrite   


 


Urine Bilirubin   


 


Urine Urobilinogen   


 


Ur Leukocyte Esterase   


 


Urine WBC (Auto)   


 


Urine RBC (Auto)   


 


Ur Epithelial Cells   


 


Hyaline Casts   


 


Urine Mucus   


 


Urine HCG, Qual  Negative  


 


Acetone, Qual    Trace H














  05/21/18





  05:50


 


WBC  6.5


 


RBC  5.31 H


 


Hgb  10.5 L


 


Hct  33.8


 


MCV  63.7 L


 


MCH  19.8 L


 


MCHC  31.0 L


 


RDW  14.6


 


Plt Count  251


 


MPV  9.9


 


Neutrophils %  71.8


 


Lymphocytes %  18.5  D


 


Monocytes %  9.3


 


Eosinophils %  0.1  D


 


Basophils %  0.3


 


VBG pH 


 


POC VBG pCO2 


 


POC VBG pO2 


 


Mixed VBG HCO3 


 


Sodium 


 


Potassium 


 


Chloride 


 


Carbon Dioxide 


 


Anion Gap 


 


BUN 


 


Creatinine 


 


Creat Clearance w eGFR 


 


Random Glucose 


 


Calcium 


 


Magnesium 


 


Total Bilirubin 


 


AST 


 


ALT 


 


Alkaline Phosphatase 


 


Troponin I 


 


Total Protein 


 


Albumin 


 


Lipase 


 


TSH 


 


Free T4 


 


Urine Color 


 


Urine Appearance 


 


Urine pH 


 


Ur Specific Gravity 


 


Urine Protein 


 


Urine Glucose (UA) 


 


Urine Ketones 


 


Urine Blood 


 


Urine Nitrite 


 


Urine Bilirubin 


 


Urine Urobilinogen 


 


Ur Leukocyte Esterase 


 


Urine WBC (Auto) 


 


Urine RBC (Auto) 


 


Ur Epithelial Cells 


 


Hyaline Casts 


 


Urine Mucus 


 


Urine HCG, Qual 


 


Acetone, Qual 














*DC/Admit/Observation/Transfer


Diagnosis at time of Disposition: 


Abdominal pain


Qualifiers:


 Abdominal location: unspecified location Qualified Code(s): R10.9 - 

Unspecified abdominal pain








- Discharge Dispostion


Disposition: HOME





- Referrals


Referrals: 


Ramona Mcallister MD [Primary Care Provider] - 





- Patient Instructions


Printed Discharge Instructions:  DI for Abdominal Pain-Adult


Additional Instructions: 


Please follow-up with endocrinologist and Gastroenterologist at earliest 

available appointment as discussed. Follow-up with primary care physician this 

week for further evaluation. Return to ER if any increase /return of abdominal 

pain, fever, chills, or other concerning symptoms. We hope you feel better soon.





- Post Discharge Activity

## 2018-05-21 NOTE — EKG
Test Reason : 

Blood Pressure : ***/*** mmHG

Vent. Rate : 078 BPM     Atrial Rate : 078 BPM

   P-R Int : 142 ms          QRS Dur : 082 ms

    QT Int : 400 ms       P-R-T Axes : 054 071 041 degrees

   QTc Int : 456 ms

 

NORMAL SINUS RHYTHM

NONSPECIFIC T WAVE ABNORMALITY

ABNORMAL ECG

NO PREVIOUS ECGS AVAILABLE

Confirmed by JESSICA GUAJARDO MD ([id number]) on 5/21/2018 11:40:48 PM

 

Referred By:             Confirmed By:JESSICA GUAJARDO MD

## 2019-03-12 ENCOUNTER — EMERGENCY (EMERGENCY)
Facility: HOSPITAL | Age: 24
LOS: 1 days | Discharge: HOME | End: 2019-03-12
Attending: EMERGENCY MEDICINE | Admitting: EMERGENCY MEDICINE
Payer: MEDICAID

## 2019-03-12 VITALS
HEART RATE: 68 BPM | TEMPERATURE: 98 F | SYSTOLIC BLOOD PRESSURE: 132 MMHG | RESPIRATION RATE: 18 BRPM | OXYGEN SATURATION: 98 % | DIASTOLIC BLOOD PRESSURE: 93 MMHG

## 2019-03-12 VITALS
DIASTOLIC BLOOD PRESSURE: 83 MMHG | OXYGEN SATURATION: 100 % | TEMPERATURE: 98 F | SYSTOLIC BLOOD PRESSURE: 141 MMHG | HEART RATE: 74 BPM | RESPIRATION RATE: 18 BRPM

## 2019-03-12 DIAGNOSIS — R06.09 OTHER FORMS OF DYSPNEA: ICD-10-CM

## 2019-03-12 DIAGNOSIS — R51 HEADACHE: ICD-10-CM

## 2019-03-12 DIAGNOSIS — Z98.890 OTHER SPECIFIED POSTPROCEDURAL STATES: Chronic | ICD-10-CM

## 2019-03-12 DIAGNOSIS — R07.9 CHEST PAIN, UNSPECIFIED: ICD-10-CM

## 2019-03-12 DIAGNOSIS — R61 GENERALIZED HYPERHIDROSIS: ICD-10-CM

## 2019-03-12 DIAGNOSIS — E10.9 TYPE 1 DIABETES MELLITUS WITHOUT COMPLICATIONS: ICD-10-CM

## 2019-03-12 LAB
ALBUMIN SERPL ELPH-MCNC: 3.7 G/DL — SIGNIFICANT CHANGE UP (ref 3.4–5)
ALP SERPL-CCNC: 52 U/L — SIGNIFICANT CHANGE UP (ref 40–120)
ALT FLD-CCNC: 16 U/L — SIGNIFICANT CHANGE UP (ref 12–42)
ANION GAP SERPL CALC-SCNC: 11 MMOL/L — SIGNIFICANT CHANGE UP (ref 9–16)
APPEARANCE UR: CLEAR — SIGNIFICANT CHANGE UP
APTT BLD: 35.1 SEC — SIGNIFICANT CHANGE UP (ref 27.5–36.3)
AST SERPL-CCNC: 16 U/L — SIGNIFICANT CHANGE UP (ref 15–37)
BACTERIA # UR AUTO: SIGNIFICANT CHANGE UP /HPF
BASOPHILS NFR BLD AUTO: 0.6 % — SIGNIFICANT CHANGE UP (ref 0–2)
BILIRUB SERPL-MCNC: 0.4 MG/DL — SIGNIFICANT CHANGE UP (ref 0.2–1.2)
BILIRUB UR-MCNC: NEGATIVE — SIGNIFICANT CHANGE UP
BUN SERPL-MCNC: 12 MG/DL — SIGNIFICANT CHANGE UP (ref 7–23)
CALCIUM SERPL-MCNC: 8.3 MG/DL — LOW (ref 8.5–10.5)
CHLORIDE SERPL-SCNC: 104 MMOL/L — SIGNIFICANT CHANGE UP (ref 96–108)
CO2 SERPL-SCNC: 24 MMOL/L — SIGNIFICANT CHANGE UP (ref 22–31)
COLOR SPEC: YELLOW — SIGNIFICANT CHANGE UP
CREAT SERPL-MCNC: 0.9 MG/DL — SIGNIFICANT CHANGE UP (ref 0.5–1.3)
D DIMER BLD IA.RAPID-MCNC: <187 NG/ML DDU — SIGNIFICANT CHANGE UP
DIFF PNL FLD: ABNORMAL
EOSINOPHIL NFR BLD AUTO: 1.7 % — SIGNIFICANT CHANGE UP (ref 0–6)
EPI CELLS # UR: SIGNIFICANT CHANGE UP /HPF (ref 0–5)
GLUCOSE SERPL-MCNC: 140 MG/DL — HIGH (ref 70–99)
GLUCOSE UR QL: NEGATIVE — SIGNIFICANT CHANGE UP
HCG UR QL: NEGATIVE — SIGNIFICANT CHANGE UP
HCT VFR BLD CALC: 37.1 % — SIGNIFICANT CHANGE UP (ref 34.5–45)
HGB BLD-MCNC: 11.2 G/DL — LOW (ref 11.5–15.5)
IMM GRANULOCYTES NFR BLD AUTO: 0.3 % — SIGNIFICANT CHANGE UP (ref 0–1.5)
INR BLD: 1.17 — HIGH (ref 0.88–1.16)
KETONES UR-MCNC: NEGATIVE — SIGNIFICANT CHANGE UP
LEUKOCYTE ESTERASE UR-ACNC: NEGATIVE — SIGNIFICANT CHANGE UP
LYMPHOCYTES # BLD AUTO: 15.5 % — SIGNIFICANT CHANGE UP (ref 13–44)
MCHC RBC-ENTMCNC: 20.1 PG — LOW (ref 27–34)
MCHC RBC-ENTMCNC: 30.2 G/DL — LOW (ref 32–36)
MCV RBC AUTO: 66.6 FL — LOW (ref 80–100)
MONOCYTES NFR BLD AUTO: 6.8 % — SIGNIFICANT CHANGE UP (ref 2–14)
NEUTROPHILS NFR BLD AUTO: 75.1 % — SIGNIFICANT CHANGE UP (ref 43–77)
NITRITE UR-MCNC: NEGATIVE — SIGNIFICANT CHANGE UP
PH UR: 6.5 — SIGNIFICANT CHANGE UP (ref 5–8)
PLATELET # BLD AUTO: 227 K/UL — SIGNIFICANT CHANGE UP (ref 150–400)
POTASSIUM SERPL-MCNC: 3.7 MMOL/L — SIGNIFICANT CHANGE UP (ref 3.5–5.3)
POTASSIUM SERPL-SCNC: 3.7 MMOL/L — SIGNIFICANT CHANGE UP (ref 3.5–5.3)
PROT SERPL-MCNC: 7.2 G/DL — SIGNIFICANT CHANGE UP (ref 6.4–8.2)
PROT UR-MCNC: NEGATIVE MG/DL — SIGNIFICANT CHANGE UP
PROTHROM AB SERPL-ACNC: 13.1 SEC — HIGH (ref 10–12.9)
RBC # BLD: 5.57 M/UL — HIGH (ref 3.8–5.2)
RBC # FLD: 14.9 % — HIGH (ref 10.3–14.5)
RBC CASTS # UR COMP ASSIST: < 5 /HPF — SIGNIFICANT CHANGE UP
SODIUM SERPL-SCNC: 139 MMOL/L — SIGNIFICANT CHANGE UP (ref 132–145)
SP GR SPEC: <=1.005 — SIGNIFICANT CHANGE UP (ref 1–1.03)
TROPONIN I SERPL-MCNC: <0.017 NG/ML — LOW (ref 0.02–0.06)
TSH SERPL-MCNC: 0.27 UIU/ML — LOW (ref 0.36–3.74)
UROBILINOGEN FLD QL: 0.2 E.U./DL — SIGNIFICANT CHANGE UP
WBC # BLD: 9.4 K/UL — SIGNIFICANT CHANGE UP (ref 3.8–10.5)
WBC # FLD AUTO: 9.4 K/UL — SIGNIFICANT CHANGE UP (ref 3.8–10.5)
WBC UR QL: < 5 /HPF — SIGNIFICANT CHANGE UP

## 2019-03-12 PROCEDURE — 99284 EMERGENCY DEPT VISIT MOD MDM: CPT | Mod: 25

## 2019-03-12 PROCEDURE — 93010 ELECTROCARDIOGRAM REPORT: CPT

## 2019-03-12 RX ORDER — METHOCARBAMOL 500 MG/1
1 TABLET, FILM COATED ORAL
Qty: 15 | Refills: 0 | OUTPATIENT
Start: 2019-03-12 | End: 2019-03-16

## 2019-03-12 RX ORDER — METHOCARBAMOL 500 MG/1
750 TABLET, FILM COATED ORAL ONCE
Qty: 0 | Refills: 0 | Status: COMPLETED | OUTPATIENT
Start: 2019-03-12 | End: 2019-03-12

## 2019-03-12 RX ORDER — SODIUM CHLORIDE 9 MG/ML
1000 INJECTION INTRAMUSCULAR; INTRAVENOUS; SUBCUTANEOUS ONCE
Qty: 0 | Refills: 0 | Status: COMPLETED | OUTPATIENT
Start: 2019-03-12 | End: 2019-03-12

## 2019-03-12 RX ORDER — KETOROLAC TROMETHAMINE 30 MG/ML
30 SYRINGE (ML) INJECTION ONCE
Qty: 0 | Refills: 0 | Status: DISCONTINUED | OUTPATIENT
Start: 2019-03-12 | End: 2019-03-12

## 2019-03-12 RX ORDER — IBUPROFEN 200 MG
1 TABLET ORAL
Qty: 20 | Refills: 0 | OUTPATIENT
Start: 2019-03-12 | End: 2019-03-16

## 2019-03-12 RX ORDER — METOCLOPRAMIDE HCL 10 MG
10 TABLET ORAL ONCE
Qty: 0 | Refills: 0 | Status: COMPLETED | OUTPATIENT
Start: 2019-03-12 | End: 2019-03-12

## 2019-03-12 RX ORDER — METOCLOPRAMIDE HCL 10 MG
1 TABLET ORAL
Qty: 15 | Refills: 0 | OUTPATIENT
Start: 2019-03-12 | End: 2019-03-16

## 2019-03-12 RX ADMIN — Medication 10 MILLIGRAM(S): at 16:11

## 2019-03-12 RX ADMIN — METHOCARBAMOL 750 MILLIGRAM(S): 500 TABLET, FILM COATED ORAL at 16:13

## 2019-03-12 RX ADMIN — SODIUM CHLORIDE 2000 MILLILITER(S): 9 INJECTION INTRAMUSCULAR; INTRAVENOUS; SUBCUTANEOUS at 16:12

## 2019-03-12 RX ADMIN — Medication 30 MILLIGRAM(S): at 16:12

## 2019-03-12 NOTE — ED PROVIDER NOTE - PSYCHIATRIC, MLM
Alert and oriented to person, place, time/situation. normal mood and affect. no apparent risk to self or others. Alert and oriented to person, place, time/situation. normal mood and affect. s. Alert and oriented to person, place, time/situation. normal mood and affect.

## 2019-03-12 NOTE — ED PROVIDER NOTE - OBJECTIVE STATEMENT
22 y/o Female with PMHx of DM (Type 1), Migraines and thyroidectomy presents to the ED c/o CP started x2 wks ago. Pt reports difficult breathing and severe HA from right side of the head radiating to the neck. She took Tylenol for the HA and it resolved. This morning, she took Tylenol again but with no relieve. Pt notes associated symptoms of chills, and sweating. Pt saw her neurologist x5 yrs ago and in December, saw her PCP and she reports her thyroid was normal. Denies fever, body aches, and cough. Denies taking steroids and OCP. No recent travel.

## 2019-03-12 NOTE — ED PROVIDER NOTE - CARE PROVIDER_API CALL
Getachew Gorman)  Cardiovascular Disease; Internal Medicine  [address] [facility] [facility], 3rd [place] [address]  Phone: [phone]  Fax: [phone]  Follow Up Time:

## 2019-03-12 NOTE — ED ADULT NURSE NOTE - NSIMPLEMENTINTERV_GEN_ALL_ED
Implemented All Universal Safety Interventions:  [place] to call system. Call bell, personal items and telephone within reach. Instruct patient to call for assistance. Room bathroom lighting operational. Non-slip footwear when patient is off stretcher. Physically safe environment: no spills, clutter or unnecessary equipment. Stretcher in lowest position, wheels locked, appropriate side rails in place.

## 2019-03-12 NOTE — ED PROVIDER NOTE - NSFOLLOWUPINSTRUCTIONS_ED_ALL_ED_FT
Headache    A headache is pain or discomfort felt around the head or neck area. The specific cause of a headache may not be found as there are many types including tension headaches, migraine headaches, and cluster headaches. Watch your condition for any changes. Things you can do to manage your pain include taking over the counter and prescription medications as instructed by your health care provider, lying down in a dark quiet room, limiting stress, getting regular sleep, and refraining from alcohol and tobacco products.    SEEK IMMEDIATE MEDICAL CARE IF YOU HAVE ANY OF THE FOLLOWING SYMPTOMS: fever, vomiting, stiff neck, loss of vision, problems with speech, muscle weakness, loss of balance, trouble walking, passing out, or confusion.      Chest Pain    Chest pain can be caused by many different conditions which may or may not be dangerous. Causes include heartburn, lung infections, heart attack, blood clot in lungs, skin infections, strain or damage to muscle, cartilage, or bones, etc. In addition to a history and physical examination, an electrocardiogram (ECG) or other lab tests may have been performed to determine the cause of your chest pain. Follow up with your primary care provider or with a cardiologist as instructed.     SEEK IMMEDIATE MEDICAL CARE IF YOU HAVE ANY OF THE FOLLOWING SYMPTOMS: worsening chest pain, coughing up blood, unexplained back/neck/jaw pain, severe abdominal pain, dizziness or lightheadedness, fainting, shortness of breath, sweaty or clammy skin, vomiting, or racing heart beat. These symptoms may represent a serious problem that is an emergency. Do not wait to see if the symptoms will go away. Get medical help right away. Call 911 and do not drive yourself to the hospital.

## 2019-03-12 NOTE — ED PROVIDER NOTE - PROGRESS NOTE DETAILS
significantly improved HA, still some pain w palpation, recommend motrin/robaxin, also reglan for HA. Cardio F/U. RTER if any worsening, Labs discussed, some incidental findings for f/u

## 2019-03-13 ENCOUNTER — INBOUND DOCUMENT (OUTPATIENT)
Age: 24
End: 2019-03-13

## 2019-03-13 PROBLEM — Z00.00 ENCOUNTER FOR PREVENTIVE HEALTH EXAMINATION: Noted: 2019-03-13

## 2019-03-14 ENCOUNTER — APPOINTMENT (OUTPATIENT)
Dept: HEART AND VASCULAR | Facility: CLINIC | Age: 24
End: 2019-03-14
Payer: MEDICAID

## 2019-03-14 ENCOUNTER — NON-APPOINTMENT (OUTPATIENT)
Age: 24
End: 2019-03-14

## 2019-03-14 VITALS
HEIGHT: 64 IN | OXYGEN SATURATION: 99 % | HEART RATE: 80 BPM | DIASTOLIC BLOOD PRESSURE: 75 MMHG | WEIGHT: 178 LBS | SYSTOLIC BLOOD PRESSURE: 108 MMHG | BODY MASS INDEX: 30.39 KG/M2

## 2019-03-14 DIAGNOSIS — Z86.39 PERSONAL HISTORY OF OTHER ENDOCRINE, NUTRITIONAL AND METABOLIC DISEASE: ICD-10-CM

## 2019-03-14 DIAGNOSIS — I49.1 ATRIAL PREMATURE DEPOLARIZATION: ICD-10-CM

## 2019-03-14 DIAGNOSIS — R07.9 CHEST PAIN, UNSPECIFIED: ICD-10-CM

## 2019-03-14 DIAGNOSIS — E10.9 TYPE 1 DIABETES MELLITUS W/OUT COMPLICATIONS: ICD-10-CM

## 2019-03-14 PROCEDURE — 93000 ELECTROCARDIOGRAM COMPLETE: CPT

## 2019-03-14 PROCEDURE — 99204 OFFICE O/P NEW MOD 45 MIN: CPT

## 2019-03-14 RX ORDER — LEVOTHYROXINE SODIUM 137 UG/1
137 TABLET ORAL
Refills: 0 | Status: ACTIVE | COMMUNITY

## 2019-03-14 RX ORDER — ESCITALOPRAM OXALATE 10 MG/1
10 TABLET, FILM COATED ORAL
Refills: 0 | Status: ACTIVE | COMMUNITY

## 2019-03-14 RX ORDER — LURASIDONE HYDROCHLORIDE 40 MG/1
40 TABLET, FILM COATED ORAL
Refills: 0 | Status: ACTIVE | COMMUNITY

## 2019-03-14 NOTE — PHYSICAL EXAM
[General Appearance - Well Developed] : well developed [Normal Appearance] : normal appearance [Well Groomed] : well groomed [General Appearance - Well Nourished] : well nourished [No Deformities] : no deformities [General Appearance - In No Acute Distress] : no acute distress [Normal Conjunctiva] : the conjunctiva exhibited no abnormalities [Eyelids - No Xanthelasma] : the eyelids demonstrated no xanthelasmas [Normal Oral Mucosa] : normal oral mucosa [No Oral Pallor] : no oral pallor [No Oral Cyanosis] : no oral cyanosis [Normal Jugular Venous A Waves Present] : normal jugular venous A waves present [Normal Jugular Venous V Waves Present] : normal jugular venous V waves present [No Jugular Venous Fernandez A Waves] : no jugular venous fernandez A waves [Heart Rate And Rhythm] : heart rate and rhythm were normal [Heart Sounds] : normal S1 and S2 [Murmurs] : no murmurs present [Respiration, Rhythm And Depth] : normal respiratory rhythm and effort [Exaggerated Use Of Accessory Muscles For Inspiration] : no accessory muscle use [Auscultation Breath Sounds / Voice Sounds] : lungs were clear to auscultation bilaterally [Abdomen Soft] : soft [Abdomen Tenderness] : non-tender [Abdomen Mass (___ Cm)] : no abdominal mass palpated [Abnormal Walk] : normal gait [Gait - Sufficient For Exercise Testing] : the gait was sufficient for exercise testing [Nail Clubbing] : no clubbing of the fingernails [Cyanosis, Localized] : no localized cyanosis [Petechial Hemorrhages (___cm)] : no petechial hemorrhages [Skin Color & Pigmentation] : normal skin color and pigmentation [] : no rash [No Venous Stasis] : no venous stasis [Skin Lesions] : no skin lesions [No Skin Ulcers] : no skin ulcer [No Xanthoma] : no  xanthoma was observed [Oriented To Time, Place, And Person] : oriented to person, place, and time [Affect] : the affect was normal [Mood] : the mood was normal [No Anxiety] : not feeling anxious

## 2019-03-14 NOTE — REASON FOR VISIT
[Initial Evaluation] : an initial evaluation of [Chest Pain] : chest pain [FreeTextEntry1] :  24 y/o Female with PMHx of DM (Type 1), Migraines\par and thyroidectomy presents to the ED c/o CP started x2 wks ago. Pt reports\par difficult breathing and severe HA from right side of the head radiating to the\par neck. She took Tylenol for the HA and it resolved. This morning, she took\par Tylenol again but with no relieve. Pt notes associated symptoms of chills, and\par sweating. Pt saw her neurologist x5 yrs ago and in December, saw her PCP and\par she reports her thyroid was normal. Denies fever, body aches, and cough. Denies\par taking steroids and OCP. No recent travel.

## 2019-03-25 ENCOUNTER — EMERGENCY (EMERGENCY)
Facility: HOSPITAL | Age: 24
LOS: 1 days | Discharge: HOME | End: 2019-03-25
Admitting: EMERGENCY MEDICINE
Payer: MEDICAID

## 2019-03-25 VITALS
DIASTOLIC BLOOD PRESSURE: 73 MMHG | OXYGEN SATURATION: 98 % | RESPIRATION RATE: 16 BRPM | TEMPERATURE: 98 F | SYSTOLIC BLOOD PRESSURE: 128 MMHG | HEART RATE: 69 BPM

## 2019-03-25 VITALS
HEART RATE: 63 BPM | SYSTOLIC BLOOD PRESSURE: 125 MMHG | WEIGHT: 177.91 LBS | OXYGEN SATURATION: 99 % | DIASTOLIC BLOOD PRESSURE: 76 MMHG | TEMPERATURE: 98 F | RESPIRATION RATE: 17 BRPM

## 2019-03-25 DIAGNOSIS — I10 ESSENTIAL (PRIMARY) HYPERTENSION: ICD-10-CM

## 2019-03-25 DIAGNOSIS — E10.9 TYPE 1 DIABETES MELLITUS WITHOUT COMPLICATIONS: ICD-10-CM

## 2019-03-25 DIAGNOSIS — R07.89 OTHER CHEST PAIN: ICD-10-CM

## 2019-03-25 DIAGNOSIS — Z79.899 OTHER LONG TERM (CURRENT) DRUG THERAPY: ICD-10-CM

## 2019-03-25 DIAGNOSIS — Z79.4 LONG TERM (CURRENT) USE OF INSULIN: ICD-10-CM

## 2019-03-25 DIAGNOSIS — Z79.1 LONG TERM (CURRENT) USE OF NON-STEROIDAL ANTI-INFLAMMATORIES (NSAID): ICD-10-CM

## 2019-03-25 DIAGNOSIS — Z98.890 OTHER SPECIFIED POSTPROCEDURAL STATES: Chronic | ICD-10-CM

## 2019-03-25 PROBLEM — E11.9 TYPE 2 DIABETES MELLITUS WITHOUT COMPLICATIONS: Chronic | Status: ACTIVE | Noted: 2019-03-12

## 2019-03-25 PROCEDURE — 99284 EMERGENCY DEPT VISIT MOD MDM: CPT | Mod: 25

## 2019-03-25 PROCEDURE — 71046 X-RAY EXAM CHEST 2 VIEWS: CPT | Mod: 26

## 2019-03-25 PROCEDURE — 93010 ELECTROCARDIOGRAM REPORT: CPT

## 2019-03-25 RX ORDER — IBUPROFEN 200 MG
600 TABLET ORAL ONCE
Qty: 0 | Refills: 0 | Status: COMPLETED | OUTPATIENT
Start: 2019-03-25 | End: 2019-03-25

## 2019-03-25 RX ADMIN — Medication 600 MILLIGRAM(S): at 13:30

## 2019-03-25 NOTE — ED PROVIDER NOTE - NSFOLLOWUPINSTRUCTIONS_ED_ALL_ED_FT
-PLEASE FOLLOW-UP WITH YOUR PRIMARY CARE DOCTOR IN 1-2 DAYS.  BRING ALL PAPERWORK FROM TODAY'S VISIT TO YOUR FOLLOW-UP VISIT.  IF YOU DO NOT HAVE A PRIMARY CARE DOCTOR PLEASE REFER TO THE OFFICE/CLINIC INFORMATION GIVEN ABOVE.  YOU MAY ALSO CALL [phone] AND ASK FOR MSChristina FIONA VAUGHN.  SHE CAN HELP YOU MAKE A FOLLOW-UP APPOINTMENT.  HER HOURS ARE 11AM-7PM MONDAY - FRIDAY.  -TAKE OVER THE COUNTER TYLENOL 650MG BY MOUTH EVERY 4-6 HOURS AS NEEDED FOR PAIN.  DO NOT MIX WITH ALCOHOL OR OTHER PRESCRIPTION MEDICATIONS THAT ALREADY CONTAIN TYLENOL OR ACETAMINOPHEN.   -TAKE OVER THE COUNTER IBUPROFEN 400-600MG BY MOUTH EVERY 8 HOURS AS NEEDED FOR PAIN.  BE SURE TO TAKE WITH FOOD OR MILK AS THIS MEDICATION CAN CAUSE STOMACH IRRITATION.  -PLEASE RETURN TO THE ER IMMEDIATELY OR CALL 911 FOR ANY HIGH FEVER, TROUBLE BREATHING, VOMITING, SEVERE PAIN, OR ANY OTHER CONCERNS.     Chest Pain    Chest pain can be caused by many different conditions which may or may not be dangerous. Causes include heartburn, lung infections, heart attack, blood clot in lungs, skin infections, strain or damage to muscle, cartilage, or bones, etc. In addition to a history and physical examination, an electrocardiogram (ECG) or other lab tests may have been performed to determine the cause of your chest pain. Follow up with your primary care provider or with a cardiologist as instructed.     SEEK IMMEDIATE MEDICAL CARE IF YOU HAVE ANY OF THE FOLLOWING SYMPTOMS: worsening chest pain, coughing up blood, unexplained back/neck/jaw pain, severe abdominal pain, dizziness or lightheadedness, fainting, shortness of breath, sweaty or clammy skin, vomiting, or racing heart beat. These symptoms may represent a serious problem that is an emergency. Do not wait to see if the symptoms will go away. Get medical help right away. Call 911 and do not drive yourself to the hospital.

## 2019-03-25 NOTE — ED PROVIDER NOTE - CLINICAL SUMMARY MEDICAL DECISION MAKING FREE TEXT BOX
pt returning to ED for worsening chest pain, had cardiology workup at [facility]. VSS, exam unremarkable, will give ibuprofen, obtain chest XR, ekg, and reassess. pt returning to ED for ongoing episodic chest pain, had extensive cardiac workup at [facility]- also seen by Dr. covarrubias. VSS, exam unremarkable, will give ibuprofen, obtain chest XR, ekg, and reassess. Pt has stress test scheduled in 1 week at [facility]. Holter monitor and echo normal.

## 2019-03-25 NOTE — ED PROVIDER NOTE - DIAGNOSTIC INTERPRETATION
Chest x-ray interpreted by KELSEY Pierce  Findings: heart size normal, no infiltrates, lungs fully expanded, soft tissues appear normal.

## 2019-03-25 NOTE — ED PROVIDER NOTE - NS ED ROS FT
Denies fevers, chills, nausea, diarrhea, constipation, abdominal pain, urinary symptoms, palpitations, dyspnea on exertion, syncope/near syncope, cough/URI symptoms, headache, weakness, numbness, focal deficits, visual changes, gait or balance changes, dizziness

## 2019-03-25 NOTE — ED PROVIDER NOTE - PROGRESS NOTE DETAILS
EKG unchanged, CXR normal. Vitals normal. PE unremarkable. Pt well appearing. pain improved with ibuprofen. I called and left voicemail for Dr. Jarvis - pt will f/u with Him in office tomorrow. Given extensive cardiac workup and no change in sxs - will Dc at this time with very strict return precautions. A medical screening exam was performed and no emergency medical condition was identified. I have discussed the discharge plan with the patient. The patient agrees with the plan, as discussed. The patient understands that if the symptoms worsen or if prescribed medications do not have the desired/planned effect that the patient may return to the Emergency Department at any time for further evaluation and treatment.

## 2019-03-25 NOTE — ED ADULT TRIAGE NOTE - CHIEF COMPLAINT QUOTE
Patient presents to ED with c/o chest pain x 3 weeks. Pain worsening over last 3 days. Pt reports pain is intermittent and sharp. Associated N/V x2 yesterday. Associated SOB and dizziness. Pt saw cardiologist and had echo and wore holter monitor for 24 hrs. Nuclear stress test for 04/02/19.

## 2019-03-25 NOTE — ED ADULT NURSE NOTE - NSIMPLEMENTINTERV_GEN_ALL_ED
Implemented All Universal Safety Interventions:  [place] to call system. Call bell, personal items and telephone within reach. Instruct patient to call for assistance. Room bathroom lighting operational. Non-slip footwear when patient is off stretcher. Physically safe environment: no spills, clutter or unnecessary equipment. Stretcher in lowest position, wheels locked, appropriate side rails in place.

## 2019-03-25 NOTE — ED PROVIDER NOTE - OBJECTIVE STATEMENT
22 y/o F w/ hx of DM (type 1), migraines, and thyroidectomy returns to ED for worsening L-sided "throbbing" chest pain over the past 3 days. States she vomited yesterday and woke up in pain last night. States she feels light-headed and sob without exertion. Pt states that after being discharged from ED 2 weeks she was seen by a cardiologist (Dr Gregorio Jarvis) at [facility] where she had an echocardiogram, heart monitor for 24hrs, and a stress test. Denies any fever, chills, cough. 24 y/o F w/ hx of DM (type 1), migraines, and thyroidectomy on synthroid    Pt returns to ED for continued L-sided "throbbing" chest pain over the past 3 weeks intermittently. States this episode woke her up from pain last night. States episodes occur at random- nothing aggravates or alleviates sxs. States sometime she feels shortness of breath and nausea. Pt was seen her in Ed for same sxs 2 weeks ago- trop, d-dimer, vitals and all labs were normal. Followed up with Dr. Gorman in office- normal echo and EKG. Pt states she then went for a second opinion- Dr Gregorio Jarvis at [facility] where she had an echocardiogram, Holter monitor for 24hrs, ekg- all normal. Pt is scheduled for a stress test next week at [facility]. States there is no change in character, location or severity of sxs. Denies any fever, chills, cough, syncope/near syncope, wheezing, palpitations, leg pain/swelling, orthopnea

## 2019-04-01 ENCOUNTER — APPOINTMENT (OUTPATIENT)
Dept: HEART AND VASCULAR | Facility: CLINIC | Age: 24
End: 2019-04-01

## 2019-12-29 ENCOUNTER — HOSPITAL ENCOUNTER (EMERGENCY)
Dept: HOSPITAL 74 - JER | Age: 24
Discharge: HOME | End: 2019-12-29
Payer: COMMERCIAL

## 2019-12-29 VITALS — SYSTOLIC BLOOD PRESSURE: 141 MMHG | DIASTOLIC BLOOD PRESSURE: 85 MMHG | TEMPERATURE: 97.9 F | HEART RATE: 80 BPM

## 2019-12-29 VITALS — BODY MASS INDEX: 30.9 KG/M2
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
## 2019-12-29 DIAGNOSIS — Z96.41: ICD-10-CM

## 2019-12-29 DIAGNOSIS — E03.9: ICD-10-CM

## 2019-12-29 DIAGNOSIS — E10.9: ICD-10-CM

## 2019-12-29 DIAGNOSIS — R11.2: Primary | ICD-10-CM

## 2019-12-29 DIAGNOSIS — F32.9: ICD-10-CM

## 2019-12-29 DIAGNOSIS — T36.8X5A: ICD-10-CM

## 2019-12-29 DIAGNOSIS — Z79.4: ICD-10-CM

## 2019-12-29 LAB
ALBUMIN SERPL-MCNC: 4.2 G/DL (ref 3.4–5)
ALP SERPL-CCNC: 73 U/L (ref 45–117)
ALT SERPL-CCNC: 23 U/L (ref 13–61)
ANION GAP SERPL CALC-SCNC: 6 MMOL/L (ref 8–16)
ANISOCYTOSIS BLD QL: (no result)
APPEARANCE UR: CLEAR
AST SERPL-CCNC: 14 U/L (ref 15–37)
BASOPHILS # BLD: 1 % (ref 0–2)
BILIRUB SERPL-MCNC: 0.3 MG/DL (ref 0.2–1)
BILIRUB UR STRIP.AUTO-MCNC: NEGATIVE MG/DL
BUN SERPL-MCNC: 10.6 MG/DL (ref 7–18)
CALCIUM SERPL-MCNC: 9.1 MG/DL (ref 8.5–10.1)
CHLORIDE SERPL-SCNC: 103 MMOL/L (ref 98–107)
CO2 SERPL-SCNC: 29 MMOL/L (ref 21–32)
COLOR UR: YELLOW
CREAT SERPL-MCNC: 0.8 MG/DL (ref 0.55–1.3)
DEPRECATED RDW RBC AUTO: 17 % (ref 11.6–15.6)
EOSINOPHIL # BLD: 2.1 % (ref 0–4.5)
GLUCOSE SERPL-MCNC: 123 MG/DL (ref 74–106)
HCT VFR BLD CALC: 37.7 % (ref 32.4–45.2)
HGB BLD-MCNC: 11.4 GM/DL (ref 10.7–15.3)
KETONES UR QL STRIP: (no result)
LEUKOCYTE ESTERASE UR QL STRIP.AUTO: NEGATIVE
LIPASE SERPL-CCNC: 81 U/L (ref 73–393)
LYMPHOCYTES # BLD: 21.1 % (ref 8–40)
MCH RBC QN AUTO: 18.6 PG (ref 25.7–33.7)
MCHC RBC AUTO-ENTMCNC: 30.3 G/DL (ref 32–36)
MCV RBC: 61.5 FL (ref 80–96)
MONOCYTES # BLD AUTO: 7.2 % (ref 3.8–10.2)
NEUTROPHILS # BLD: 68.6 % (ref 42.8–82.8)
NITRITE UR QL STRIP: NEGATIVE
PH UR: 7.5 [PH] (ref 5–8)
PLATELET # BLD AUTO: 374 K/MM3 (ref 134–434)
PMV BLD: 9.4 FL (ref 7.5–11.1)
POTASSIUM SERPLBLD-SCNC: 3.6 MMOL/L (ref 3.5–5.1)
PROT SERPL-MCNC: 7.6 G/DL (ref 6.4–8.2)
PROT UR QL STRIP: NEGATIVE
PROT UR QL STRIP: NEGATIVE
RBC # BLD AUTO: 6.13 M/MM3 (ref 3.6–5.2)
SODIUM SERPL-SCNC: 139 MMOL/L (ref 136–145)
SP GR UR: 1.02 (ref 1.01–1.03)
UROBILINOGEN UR STRIP-MCNC: 0.2 MG/DL (ref 0.2–1)
WBC # BLD AUTO: 9.3 K/MM3 (ref 4–10)

## 2019-12-29 PROCEDURE — 3E033GC INTRODUCTION OF OTHER THERAPEUTIC SUBSTANCE INTO PERIPHERAL VEIN, PERCUTANEOUS APPROACH: ICD-10-PCS

## 2019-12-29 NOTE — PDOC
Attending Attestation





- Resident


Resident Name: Earnest Taylor





- ED Attending Attestation


I have performed the following: I have examined & evaluated the patient, The 

case was reviewed & discussed with the resident, I agree w/resident's findings 

& plan





- HPI


HPI: 





12/29/19 17:35


24 yof with h/o IDDM with pump, graves disease presenting with X 4 days with 

nausea/vomiting, inability to tolerate PO intake.


she was recently dx'd with sinusitis before [facility] and rx'd amoxicillin, 

which she was taking about 4 days in when she developed NBNB emesis and nausea; 

some abdominal cramping. x 5 episodes of vomiting today. 





Went to urgent care today, +ketones in urine; usually BS in 300s


Denies fever or chills, cp or cough, dizziness, syncope.


pt states her congestion and sinusitis is improving.


denies sick contacts or travel


12/29/19 17:39








- Physicial Exam


PE: 





12/29/19 16:51


Agree with the resident's HPI and PE as documented in the electronic medical 

record.


NAD, well appearing, EOMI, PERRL, proptotic eyes, nl conjunctiva, anicteric; 

neck supple. lungs clear, RRR, abdomen soft nontender. No rebound, no guarding. 

insulin pump attached to left buttock, intact and functional. Back nontender. 

[place] x4, no focal neuro deficits. No peripheral edema. normal color for ethnicity

, [place].





12/29/19 17:39








- Medical Decision Making





12/29/19 16:52


Vital Signs











Temp Pulse Resp BP Pulse Ox


 


 98.4 F   89   18   134/101 H  100 


 


 12/29/19 14:47  12/29/19 14:47  12/29/19 14:47  12/29/19 14:47  12/29/19 14:47





ddx. dehydration, HHS, DKA, anemia, electrolyte/metabolic derangements. 

infection, pregnancy.





VS hypertensive, nontoxic, no fever, normal sats. 


IV fluids, reglan, pepcid, maalox.


labs and lytes wnl, normal lytes. no anemia


no AG, so doubt acidosis


glucose is normal ~123.


ketones noted, mildly elevated beta hydroxybutyrate, so likely from n/v, 

dehydration


unlikely to be DKA without other 2 features, pt appears well.


fabiana PO intake with the reglan, IVF hydration


UA_only 1+ ketones. no s/s infection prelim





instructed pt could be viral illness vs. medication side effect (recent abx)


pt feeling better with the sinusitis, so no need to c/w abx as more likely 

viral etiology and now with medication side effect


rpt VS normalized, no tachy


reglan prn tid for n/v.





dispo:


Pt to be discharged in stable condition. Patient and family made aware of 

clinical impression, treatment recommendations and disposition plan, return 

precautions discussed (including but not limited to new or persistent/worsening 

symptoms, pain, fevers, or signs of infection, chest pain, respiratory distress

, inability to tolerate oral intake, dehydration, syncope, or neurologic changes

). Follow up with PMD and/or specialist as recommended, follow up information 

provided, take medications as instructed for duration of time. continue with 

supportive care, avoid triggers and precipitants. All questions answered to 

patient's satisfaction and expressed understanding and comfort with this. At 

the time of discharge, the patient is alert, clinically improved, tolerating po 

and verbalizes understanding of instructions, satisfied with the care received 

and felt comfortable with the plan. Patient does not suffer from an acute life-

threatening medical condition at this time and  is safe for outpatient follow-

up. 





12/29/19 17:40





12/29/19 17:43





12/29/19 17:43





12/29/19 18:37

## 2019-12-29 NOTE — PDOC
History of Present Illness





<Renuka Apple - Last Filed: 12/29/19 18:34>





- General


History Source: Patient





- History of Present Illness


Initial Comments: 





12/29/19 17:16


Ms. Myles is a 23 y/o woman w/hx IDDM, Graves disease p/w two days of N/V. She 

reports approx 5x nbnb vomiting that began on Friday, alongside inability to 

tolerate po without vomiting. She reports that her DM has been well controlled 

with her insulin pump. Her blood sugar yesterday was in the 300s, which 

resolved with additional bolus of insulin from her pump. She reports that her 

blood sugar this morning was in the 60s. She reports mild generalized abdominal 

pain after vomiting. She denies any confusion, weakness, chest pain, shortness 

of breath. She reports only being treated for DKA when she was first diagnosed 

13 years ago and has never been hospitalized for complications of DM. She 

reports being started on Amoxicillin 6 days ago (on 12/23/19) for a sinus 

infection. 











<Earnest Taylor - Last Filed: 12/29/19 18:37>





- General


Chief Complaint: Nausea/Vomiting


Stated Complaint: VOMITING/KETONURIA


Time Seen by Provider: 12/29/19 16:11





Past History





<Renuka Apple - Last Filed: 12/29/19 18:34>





- Past Medical History


COPD: No


Diabetes: Yes (TYPE 1)


Psychiatric Problems:  (depression)


Thyroid Disease: Yes (Hypothyroid)





- Reproductive History


Cervical CA: No


Dysfunctional Uterine Bleeding: No


Ectopic Pregnancy: No


Endometrial CA: No


Polycystic Ovaries: No





- Psycho Social/Smoking Cessation Hx


Smoking History: Never smoked


Hx Alcohol Use: No


Drug/Substance Use Hx: No


Substance Use Type: None





<Earnest Taylor - Last Filed: 12/29/19 18:37>





- Past Medical History


Allergies/Adverse Reactions: 


 Allergies











Allergy/AdvReac Type Severity Reaction Status Date / Time


 


No Known Allergies Allergy   Verified 12/29/19 14:51











Home Medications: 


Ambulatory Orders





Insulin Pump/Infus. Set/Meter [Accu-Chek Combo System] 1 each [facility] ASDIR 06/08/17 


Levothyroxine [Synthroid -] 137 mcg PO DAILY 06/08/17 


Lurasidone HCl [Latuda] 60 mg PO HS 12/29/19 


Metoclopramide HCl [Reglan -] 10 mg PO TID PRN #12 tablet 12/29/19 











**Review of Systems





- Review of Systems


Able to Perform ROS?: Yes


Comments:: 





12/29/19 17:29


ROS: 


GENERAL/CONSTITUTIONAL: No fever or chills. No weakness.


HEAD, EYES, EARS, NOSE AND THROAT: No change in vision. No ear pain or 

discharge. No sore throat.


CARDIOVASCULAR: No chest pain or shortness of breath


RESPIRATORY: No cough, wheezing, or hemoptysis.


GASTROINTESTINAL: Nausea, vomiting. No diarrhea or constipation.


GENITOURINARY: No dysuria, frequency, or change in urination.


MUSCULOSKELETAL: No joint or muscle swelling or pain. No neck or back pain.


SKIN: No rash


NEUROLOGIC: No headache, vertigo, loss of consciousness, or change in strength/

sensation.


ENDOCRINE: No increased thirst. No abnormal weight change


HEMATOLOGIC/LYMPHATIC: No anemia, easy bleeding, or history of blood clots.


ALLERGIC/IMMUNOLOGIC: No hives or skin allergy.








<Earnest Taylor - Last Filed: 12/29/19 18:37>





*Physical Exam





- Vital Signs


 Last Vital Signs











Temp Pulse Resp BP Pulse Ox


 


 97.9 F   80   18   141/85   100 


 


 12/29/19 18:28  12/29/19 18:28  12/29/19 18:28  12/29/19 18:28  12/29/19 18:28














<Renuka Apple - Last Filed: 12/29/19 18:34>





- Vital Signs


 Last Vital Signs











Temp Pulse Resp BP Pulse Ox


 


 98.4 F   89   18   134/101 H  100 


 


 12/29/19 14:47  12/29/19 14:47  12/29/19 14:47  12/29/19 14:47  12/29/19 14:47














- Physical Exam





12/29/19 17:29


PE: 


GENERAL: Awake, alert, and fully oriented, in no acute distress


HEAD: No signs of trauma, normocephalic, atraumatic 


EYES: PERRLA, EOMI, sclera anicteric, conjunctiva clear


ENT: Auricles normal inspection, hearing grossly normal, nares patent, 

oropharynx clear without exudates. Moist mucosa


NECK: Normal ROM, supple, no lymphadenopathy, JVD, or masses


LUNGS: No distress, speaks full sentences, clear to auscultation bilaterally 


HEART: Regular rate and rhythm, normal S1 and S2, no murmurs, rubs or gallops, 

peripheral pulses normal and equal bilaterally. 


ABDOMEN: Soft, nontender, normoactive bowel sounds.  No guarding, no rebound.  

No masses


EXTREMITIES : Normal inspection, Normal range of motion, no edema.  No clubbing 

or cyanosis


NEUROLOGICAL: Cranial nerves II through XII grossly intact.  Normal speech, 

normal gait, no focal sensorimotor deficits 


SKIN: Warm, Dry, normal turgor, no rashes or lesions noted








<Earnest Taylor - Last Filed: 12/29/19 18:37>





ED Treatment Course





- LABORATORY


CBC & Chemistry Diagram: 


 12/29/19 16:00





 12/29/19 16:00





- ADDITIONAL ORDERS


Additional order review: 


 Laboratory  Results











  12/29/19 12/29/19 12/29/19





  17:33 17:33 16:00


 


Sodium   


 


Potassium   


 


Chloride   


 


Carbon Dioxide   


 


Anion Gap   


 


BUN   


 


Creatinine   


 


Est GFR (CKD-EPI)AfAm   


 


Est GFR (CKD-EPI)NonAf   


 


Random Glucose   


 


Calcium   


 


Total Bilirubin   


 


AST   


 


ALT   


 


Alkaline Phosphatase   


 


Total Protein   


 


Albumin   


 


Lipase   


 


Beta-Hydroxybutyrate   


 


Serum Pregnancy, Qual    Negative


 


Urine Color   Yellow 


 


Urine Appearance   Clear 


 


Urine pH   7.5  D 


 


Ur Specific Gravity   1.019 


 


Urine Protein   Negative 


 


Urine Glucose (UA)   Negative 


 


Urine Ketones   1+ H 


 


Urine Blood   Negative 


 


Urine Nitrite   Negative 


 


Urine Bilirubin   Negative 


 


Urine Urobilinogen   0.2 


 


Ur Leukocyte Esterase   Negative 


 


Urine HCG, Qual  Negative  














  12/29/19 12/29/19





  16:00 16:00


 


Sodium   139


 


Potassium   3.6


 


Chloride   103


 


Carbon Dioxide   29


 


Anion Gap   6 L


 


BUN   10.6


 


Creatinine   0.8


 


Est GFR (CKD-EPI)AfAm   117.93


 


Est GFR (CKD-EPI)NonAf   101.75


 


Random Glucose   123 H


 


Calcium   9.1


 


Total Bilirubin   0.3


 


AST   14 L


 


ALT   23


 


Alkaline Phosphatase   73


 


Total Protein   7.6


 


Albumin   4.2


 


Lipase  81 


 


Beta-Hydroxybutyrate  3.6 H 


 


Serum Pregnancy, Qual  


 


Urine Color  


 


Urine Appearance  


 


Urine pH  


 


Ur Specific Gravity  


 


Urine Protein  


 


Urine Glucose (UA)  


 


Urine Ketones  


 


Urine Blood  


 


Urine Nitrite  


 


Urine Bilirubin  


 


Urine Urobilinogen  


 


Ur Leukocyte Esterase  


 


Urine HCG, Qual  








 











  12/29/19





  16:00


 


RBC  6.13 H


 


MCV  61.5 L


 


MCHC  30.3 L


 


RDW  17.0 H


 


MPV  9.4


 


Neutrophils %  68.6


 


Lymphocytes %  21.1


 


Monocytes %  7.2


 


Eosinophils %  2.1  D


 


Basophils %  1.0  D














- Medications


Given in the ED: 


ED Medications














Discontinued Medications














Generic Name Dose Route Start Last Admin





  Trade Name Cathi  PRN Reason Stop Dose Admin


 


Al Hydroxide/Mg Hydroxide  30 ml  12/29/19 16:40  12/29/19 17:22





  Mylanta Suspension -  PO  12/29/19 16:41  30 ml





  ONCE ONE   Administration





     





     





     





     


 


Famotidine/Sodium Chloride  20 mg in 50 mls @ 100 mls/hr  12/29/19 16:40  12/29/ 19 17:22





  Pepcid 20 Mg Premixed Ivpb -  IVPB  12/29/19 17:09  100 mls/hr





  ONCE ONE   Administration





     





     





     





     


 


Lactated Ringer's  1,000 ml  12/29/19 16:56  12/29/19 17:22





  Lactated Ringers Solution  IV  12/29/19 16:57  Not Given





  ONCE ONE   





     





     





     





     


 


Metoclopramide HCl  10 mg  12/29/19 16:55  12/29/19 17:22





  Reglan Injection -  IVPUSH  12/29/19 16:56  10 mg





  ONCE ONE   Administration





     





     





     





     


 


Sodium Chloride  1,000 ml  12/29/19 17:13  12/29/19 17:34





  Normal Saline -  IV  12/29/19 17:14  1,000 ml





  ONCE ONE   Administration





     





     





     





     














<Renuka Apple - Last Filed: 12/29/19 18:34>





- LABORATORY


CBC & Chemistry Diagram: 


 12/29/19 16:00





 12/29/19 16:00





<Earnest Taylor - Last Filed: 12/29/19 18:37>





Medical Decision Making





- Medical Decision Making





12/29/19 17:30


24F w/hx IDDM, graves p/w two days of N/V, 4 days into course of amoxicillin 

for sinusitis, with ketonuria at urgent care. Differential includes DKA given 

DM and ketonuria, although low suspicion given reportedly well controlled DM 

with normal blood sugar this morning and no weakness, confusion. Medication 

effect also possible given recent initiation of abx. Viral syndrome also 

possible. 





Plan: 


CBC


CMP


EKG


Beta hydroxybutyrate


Lipase


UA


1L LR


Pepcid


Maalox


Reglan





Dispo: 


Pending labs, reassessment





---


CBC - wnl


Glucose - 123, Anion Gap - 6   - r/o DKA


CMP otherwise wnl


Lipase - negative





No LR in department, NS ordered instead


Serum preg - negative





Plan for fluids, po challenge


Likely discharge








<Earnest Taylor - Last Filed: 12/29/19 18:37>





Discharge





<Renuka Apple - Last Filed: 12/29/19 18:34>





- Discharge Information


Problems reviewed: Yes





- Admission


No





<Earnest Taylor - Last Filed: 12/29/19 18:37>





- Discharge Information


Clinical Impression/Diagnosis: 


 Medication side effect





Nausea & vomiting


Qualifiers:


 Vomiting type: unspecified Vomiting Intractability: non-intractable Qualified 

Code(s): R11.2 - Nausea with vomiting, unspecified





Condition: Stable


Disposition: HOME





- Additional Discharge Information


Prescriptions: 


Metoclopramide HCl [Reglan -] 10 mg PO TID PRN #12 tablet


 PRN Reason: nausea vomiting





- Follow up/Referral


Referrals: 


Shania Clements MD [Primary Care Provider] - 





- Patient Discharge Instructions


Patient Printed Discharge Instructions:  DI for Vomiting -- Adult


Additional Instructions: 


You were seen in the ER for nausea and vomiting after an Urgent Care found 

ketones in your urine. Your blood sugar was normal. You had a slight elevation 

in your ketone level, but you are not in DKA at this time. We gave medication (

reglan, pepcid) for your nausea, and you were able to eat normally in the ER. 

Please see your primary care provider as soon as possible, in the next 7 days. 

Please return to the ER if you develop weakness, confusion, high fevers, 

intractable vomiting, or changes in your vision. 





reglan three times a day for nausea/vomiting


stop your antibiotics for the time being, this could be medication side effect 

causing your symptoms.


stay well hydrated. light meals, soups and fluids.





- Post Discharge Activity


Work/Back to School Note:  Back to Work

## 2019-12-30 NOTE — EKG
Test Reason : 

Blood Pressure : ***/*** mmHG

Vent. Rate : 075 BPM     Atrial Rate : 075 BPM

   P-R Int : 154 ms          QRS Dur : 086 ms

    QT Int : 382 ms       P-R-T Axes : 000 066 035 degrees

   QTc Int : 426 ms

 

NORMAL SINUS RHYTHM

NONSPECIFIC T WAVE ABNORMALITY

ABNORMAL ECG

WHEN COMPARED WITH ECG OF 21-MAY-2018 06:35,

NO SIGNIFICANT CHANGE WAS FOUND

Confirmed by JESSICA GUAJARDO MD ([id number]) on 12/30/2019 11:29:46 AM

 

Referred By:             Confirmed By:JESSICA GUAJARDO MD

## 2020-01-02 ENCOUNTER — HOSPITAL ENCOUNTER (EMERGENCY)
Dept: HOSPITAL 74 - JER | Age: 25
Discharge: HOME | End: 2020-01-02
Payer: COMMERCIAL

## 2020-01-02 VITALS — BODY MASS INDEX: 30.9 KG/M2

## 2020-01-02 VITALS — TEMPERATURE: 98.4 F | HEART RATE: 77 BPM | SYSTOLIC BLOOD PRESSURE: 109 MMHG | DIASTOLIC BLOOD PRESSURE: 77 MMHG

## 2020-01-02 DIAGNOSIS — Z79.4: ICD-10-CM

## 2020-01-02 DIAGNOSIS — E10.9: ICD-10-CM

## 2020-01-02 DIAGNOSIS — E03.9: ICD-10-CM

## 2020-01-02 DIAGNOSIS — F32.9: ICD-10-CM

## 2020-01-02 DIAGNOSIS — R11.2: Primary | ICD-10-CM

## 2020-01-02 LAB
ALBUMIN SERPL-MCNC: 4.4 G/DL (ref 3.4–5)
ALP SERPL-CCNC: 75 U/L (ref 45–117)
ALT SERPL-CCNC: 21 U/L (ref 13–61)
ANION GAP SERPL CALC-SCNC: 7 MMOL/L (ref 8–16)
ANISOCYTOSIS BLD QL: (no result)
APPEARANCE UR: CLEAR
AST SERPL-CCNC: 15 U/L (ref 15–37)
BACTERIA # UR AUTO: 41.8 /HPF
BASOPHILS # BLD: 0.6 % (ref 0–2)
BILIRUB SERPL-MCNC: 0.5 MG/DL (ref 0.2–1)
BILIRUB UR STRIP.AUTO-MCNC: NEGATIVE MG/DL
BUN SERPL-MCNC: 9.4 MG/DL (ref 7–18)
CALCIUM SERPL-MCNC: 9.4 MG/DL (ref 8.5–10.1)
CASTS URNS QL MICRO: 1 /LPF (ref 0–8)
CHLORIDE SERPL-SCNC: 104 MMOL/L (ref 98–107)
CO2 SERPL-SCNC: 28 MMOL/L (ref 21–32)
COLOR UR: YELLOW
CREAT SERPL-MCNC: 0.8 MG/DL (ref 0.55–1.3)
DEPRECATED RDW RBC AUTO: 17.4 % (ref 11.6–15.6)
EOSINOPHIL # BLD: 1.6 % (ref 0–4.5)
EPITH CASTS URNS QL MICRO: 1.5 /HPF
GLUCOSE SERPL-MCNC: 38 MG/DL (ref 74–106)
HCT VFR BLD CALC: 38.5 % (ref 32.4–45.2)
HGB BLD-MCNC: 11.9 GM/DL (ref 10.7–15.3)
HOWELL-JOLLY BOD BLD QL SMEAR: (no result)
KETONES UR QL STRIP: NEGATIVE
LEUKOCYTE ESTERASE UR QL STRIP.AUTO: (no result)
LYMPHOCYTES # BLD: 31.9 % (ref 8–40)
MACROCYTES BLD QL: 0
MCH RBC QN AUTO: 18.8 PG (ref 25.7–33.7)
MCHC RBC AUTO-ENTMCNC: 30.8 G/DL (ref 32–36)
MCV RBC: 61 FL (ref 80–96)
MONOCYTES # BLD AUTO: 9.4 % (ref 3.8–10.2)
NEUTROPHILS # BLD: 56.5 % (ref 42.8–82.8)
NITRITE UR QL STRIP: NEGATIVE
OVALOCYTES BLD QL SMEAR: (no result)
PH UR: >= 9 [PH] (ref 5–8)
PLATELET # BLD AUTO: 439 K/MM3 (ref 134–434)
PLATELET BLD QL SMEAR: NORMAL
PMV BLD: 10.7 FL (ref 7.5–11.1)
POTASSIUM SERPLBLD-SCNC: 3.9 MMOL/L (ref 3.5–5.1)
PROT SERPL-MCNC: 8 G/DL (ref 6.4–8.2)
PROT UR QL STRIP: (no result)
PROT UR QL STRIP: NEGATIVE
RBC # BLD AUTO: 0 /HPF (ref 0–4)
RBC # BLD AUTO: 6.32 M/MM3 (ref 3.6–5.2)
SODIUM SERPL-SCNC: 139 MMOL/L (ref 136–145)
SP GR UR: 1.01 (ref 1.01–1.03)
UROBILINOGEN UR STRIP-MCNC: 0.2 MG/DL (ref 0.2–1)
WBC # BLD AUTO: 11.4 K/MM3 (ref 4–10)
WBC # UR AUTO: 2 /HPF (ref 0–5)

## 2020-01-02 PROCEDURE — 3E033GC INTRODUCTION OF OTHER THERAPEUTIC SUBSTANCE INTO PERIPHERAL VEIN, PERCUTANEOUS APPROACH: ICD-10-PCS | Performed by: EMERGENCY MEDICINE

## 2020-01-02 RX ADMIN — DEXTROSE AND SODIUM CHLORIDE ONE MLS/HR: 5; 900 INJECTION, SOLUTION INTRAVENOUS at 15:34

## 2020-01-02 RX ADMIN — DEXTROSE AND SODIUM CHLORIDE ONE: 5; 900 INJECTION, SOLUTION INTRAVENOUS at 15:39

## 2020-01-02 NOTE — PDOC
History of Present Illness





- General


Chief Complaint: Pain


Stated Complaint: NAUSEA/VOMITING


Time Seen by Provider: 01/02/20 14:00


History Source: Patient


Exam Limitations: No Limitations





- History of Present Illness


Initial Comments: 





01/02/20 14:53


Pt is a 23 y/o female with a history of IDDM and hypothyroidism who presents to 

the ED with nausea and vomiting for several days  She was seen 4 days ago in 

the ED with similar symptoms, found to have some ketones in her urine, given 

fluids and medications and discharged home.  She state that she was ok for 2 

days after being seen in the ED and then her symptoms returned yesterday.  The 

patient admits that she vomited 3 times yesterday and 4 times today.  She 

states she has been able to keep some fluids down.  She has not checked her 

blood glucose today.  She admits to epigatric abdominal pain. The patient 

states that every few months she gets similar symptoms that last several days 

and then she will be ok for a few months.  She has not been diagnosed with 

gastroparesis in the past.  The patient was discharged with Reglan and she 

states it has not been helping.  She denies any SOB or CP. 





Is this a multiple visit Asthma Patient?: No





Past History





- Past Medical History


Allergies/Adverse Reactions: 


 Allergies











Allergy/AdvReac Type Severity Reaction Status Date / Time


 


No Known Allergies Allergy   Verified 01/02/20 14:00











Home Medications: 


Ambulatory Orders





Insulin Pump/Infus. Set/Meter [Accu-Chek Combo System] 1 each [facility] 06/08/17 


Levothyroxine [Synthroid -] 137 mcg PO DAILY 06/08/17 


Lurasidone HCl [Latuda] 60 mg PO HS 12/29/19 


Metoclopramide HCl [Reglan -] 10 mg PO TID PRN #12 tablet 12/29/19 


Ondansetron [Zofran *Odt*] 4 mg GT TID PRN 7 Days #21 tab.rapdis 01/02/20 








COPD: No


Diabetes: Yes (TYPE 1)


Psychiatric Problems:  (depression)


Thyroid Disease: Yes (Hypothyroid)





- Reproductive History


Cervical CA: No


Dysfunctional Uterine Bleeding: No


Ectopic Pregnancy: No


Endometrial CA: No


Polycystic Ovaries: No





- Psycho Social/Smoking Cessation Hx


Smoking History: Never smoked


Hx Alcohol Use: No


Drug/Substance Use Hx: No


Substance Use Type: None





**Review of Systems





- Review of Systems


Constitutional: No: Chills, Fever, Malaise, Weakness


HEENTM: No: Blurred Vision


Respiratory: No: Cough, Shortness of Breath


Cardiac (ROS): No: Chest Pain


ABD/GI: Yes: Nausea, Vomiting, Abdominal cramping.  No: Constipated, Diarrhea


Musculoskeletal: No: Muscle Pain


Integumentary: No: Rash


Neurological: No: Headache





*Physical Exam





- Vital Signs


 Last Vital Signs











Temp Pulse Resp BP Pulse Ox


 


 98 F   92 H  18   115/78   98 


 


 01/02/20 13:57  01/02/20 13:57  01/02/20 13:57  01/02/20 13:57  01/02/20 14:28














- Physical Exam


General Appearance: Yes: Nourished, Appropriately Dressed, Mild Distress


HEENT: positive: Other (moist oral mucosa)


Neck: positive: Supple.  negative: Decreased range of motion


Respiratory/Chest: positive: Lungs Clear, Normal Breath Sounds.  negative: 

Respiratory Distress


Cardiovascular: positive: Regular Rhythm, Regular Rate, S1, S2.  negative: Edema


Gastrointestinal/Abdominal: positive: Normal Bowel Sounds, Tender, Soft, 

Tenderness, Other (Moderate epigastric tenderness to palpation).  negative: 

Increased Bowel Sounds, Guarding, Rebound


Musculoskeletal: positive: Normal Inspection.  negative: Decreased Range of 

Motion


Extremity: positive: Normal Capillary Refill


Integumentary: positive: Normal Color, Dry, Warm.  negative: Rash


Neurologic: positive: CNs II-XII NML intact, Fully Oriented, Alert, Normal Mood/

Affect, Normal Response





ED Treatment Course





- LABORATORY


CBC & Chemistry Diagram: 


 01/02/20 15:00





 01/02/20 15:00





- ADDITIONAL ORDERS


Additional order review: 





01/02/20 15:13


Pt's POC blood glucose was 42 mg/dL.  Her fluids have been changed to D5NS 500 

cc bolus.  She is currently eating chips and drinking a lemonade.  





Medical Decision Making





- Medical Decision Making





01/02/20 16:16


The patient has tolerated PO fluids and food.  Her repeat POC blood sugar was 

148 mg/dL.  She states she is feeling nauseated but better than when she came 

to the ED.  She has been made aware that her labs are stable and she does not 

have any ketones in her urine. She has tolerated both food and fluid in the ED 

today. She has been instructed to drink Gatorade diluted with water to help 

keep her sugars at a normal level and she has been encouraged to check her 

blood sugar several times daily. She has been encouraged to return to the ED 

for any worsening symptoms.  She understands and agrees with treatment and plan.


01/02/20 16:22








Discharge





- Discharge Information


Problems reviewed: Yes


Clinical Impression/Diagnosis: 


 Epigastric pain





Vomiting


Qualifiers:


 Vomiting type: unspecified Vomiting Intractability: non-intractable Nausea 

presence: with nausea Qualified Code(s): R11.2 - Nausea with vomiting, 

unspecified





Condition: Stable


Disposition: HOME





- Additional Discharge Information


Prescriptions: 


Ondansetron [Zofran *Odt*] 4 mg GT TID PRN 7 Days #21 tab.rapdis


 PRN Reason: Nausea And/Or Vomiting





- Follow up/Referral


Referrals: 


ON STAFF,NOT [Primary Care Provider] - 





- Patient Discharge Instructions


Patient Printed Discharge Instructions:  DI for Vomiting -- Adult, [place] Diet


Additional Instructions: 


Eat a bland diet and drink plenty of fluids.  Consider drinking Gatorade but 

dilute by half with water.  Be sure to check your sugars often to ensure normal 

sugar levels.  You should follow up with your primary doctor within 1-2 days 

for repeat evaluation and for evaluation for possible gastroparesis.  Return to 

the ED for any worsening symptoms. 





- Post Discharge Activity


Work/Back to School Note:  Back to Work
Rapid Medical Evaluation


Chief Complaint: Pain


Time Seen by Provider: 01/02/20 14:00


Medical Evaluation: 


 Allergies











Allergy/AdvReac Type Severity Reaction Status Date / Time


 


No Known Allergies Allergy   Verified 01/02/20 14:00








Vital Signs











Temp Pulse Resp BP Pulse Ox


 


 98 F   92 H  18   115/78   99 


 


 01/02/20 13:57  01/02/20 13:57  01/02/20 13:57  01/02/20 13:57  01/02/20 13:57








01/02/20 14:00


I have performed a brief in-person evaluation of this patient.





The patient presents with a chief complaint of: nausea and vomiting with abd 

pains for 5 days. pt seen 3 days ago for symptoms and all workup was negative. 

pt report medications given for home for N/V has not been helping





Pertinent physical exam findings: A&O x 3 in NAD





I have ordered the following: nothing





The patient will proceed to the ED for further evaluation.











**Discharge Disposition





- Diagnosis


 Epigastric pain








- Discharge Dispostion


Condition at time of disposition: Stable





- Referrals





- Patient Instructions





- Post Discharge Activity
no

## 2020-04-22 ENCOUNTER — TRANSCRIPTION ENCOUNTER (OUTPATIENT)
Age: 25
End: 2020-04-22

## 2020-04-22 ENCOUNTER — APPOINTMENT (OUTPATIENT)
Dept: GASTROENTEROLOGY | Facility: CLINIC | Age: 25
End: 2020-04-22

## 2021-03-10 ENCOUNTER — HOSPITAL ENCOUNTER (EMERGENCY)
Dept: HOSPITAL 74 - JER | Age: 26
Discharge: HOME | End: 2021-03-10
Payer: COMMERCIAL

## 2021-03-10 VITALS — DIASTOLIC BLOOD PRESSURE: 71 MMHG | SYSTOLIC BLOOD PRESSURE: 120 MMHG | HEART RATE: 82 BPM

## 2021-03-10 VITALS — TEMPERATURE: 98.1 F

## 2021-03-10 VITALS — BODY MASS INDEX: 32.9 KG/M2

## 2021-03-10 DIAGNOSIS — R07.9: ICD-10-CM

## 2021-03-10 DIAGNOSIS — R51.9: Primary | ICD-10-CM

## 2021-03-10 LAB
ALBUMIN SERPL-MCNC: 3.8 G/DL (ref 3.4–5)
ALP SERPL-CCNC: 74 U/L (ref 45–117)
ALT SERPL-CCNC: 25 U/L (ref 13–61)
ANION GAP SERPL CALC-SCNC: 6 MMOL/L (ref 8–16)
ANISOCYTOSIS BLD QL: (no result)
AST SERPL-CCNC: 16 U/L (ref 15–37)
BASOPHILS # BLD: 1.3 % (ref 0–2)
BILIRUB SERPL-MCNC: 0.3 MG/DL (ref 0.2–1)
BUN SERPL-MCNC: 14 MG/DL (ref 7–18)
CALCIUM SERPL-MCNC: 9.1 MG/DL (ref 8.5–10.1)
CHLORIDE SERPL-SCNC: 107 MMOL/L (ref 98–107)
CO2 SERPL-SCNC: 26 MMOL/L (ref 21–32)
CREAT SERPL-MCNC: 0.9 MG/DL (ref 0.55–1.3)
DEPRECATED RDW RBC AUTO: 15.5 % (ref 11.6–15.6)
EOSINOPHIL # BLD: 4 % (ref 0–4.5)
GLUCOSE SERPL-MCNC: 121 MG/DL (ref 74–106)
HCT VFR BLD CALC: 38.1 % (ref 32.4–45.2)
HGB BLD-MCNC: 12.2 GM/DL (ref 10.7–15.3)
LYMPHOCYTES # BLD: 32.2 % (ref 8–40)
MACROCYTES BLD QL: 0
MCH RBC QN AUTO: 20.3 PG (ref 25.7–33.7)
MCHC RBC AUTO-ENTMCNC: 32 G/DL (ref 32–36)
MCV RBC: 63.5 FL (ref 80–96)
MONOCYTES # BLD AUTO: 7.8 % (ref 3.8–10.2)
NEUTROPHILS # BLD: 54.7 % (ref 42.8–82.8)
PLATELET # BLD AUTO: 351 K/MM3 (ref 134–434)
PLATELET BLD QL SMEAR: NORMAL
PMV BLD: 9 FL (ref 7.5–11.1)
PROT SERPL-MCNC: 7.6 G/DL (ref 6.4–8.2)
RBC # BLD AUTO: 6.01 M/MM3 (ref 3.6–5.2)
SODIUM SERPL-SCNC: 139 MMOL/L (ref 136–145)
TARGETS BLD QL SMEAR: (no result)
WBC # BLD AUTO: 9.1 K/MM3 (ref 4–10)

## 2021-03-10 PROCEDURE — 3E033GC INTRODUCTION OF OTHER THERAPEUTIC SUBSTANCE INTO PERIPHERAL VEIN, PERCUTANEOUS APPROACH: ICD-10-PCS

## 2021-04-09 ENCOUNTER — APPOINTMENT (OUTPATIENT)
Age: 26
End: 2021-04-09

## 2021-04-12 ENCOUNTER — HOSPITAL ENCOUNTER (EMERGENCY)
Dept: HOSPITAL 74 - JER | Age: 26
LOS: 1 days | Discharge: HOME | End: 2021-04-13
Payer: COMMERCIAL

## 2021-04-12 VITALS — TEMPERATURE: 98.1 F | HEART RATE: 94 BPM | DIASTOLIC BLOOD PRESSURE: 81 MMHG | SYSTOLIC BLOOD PRESSURE: 114 MMHG

## 2021-04-12 VITALS — BODY MASS INDEX: 34.9 KG/M2

## 2021-04-12 DIAGNOSIS — R07.9: ICD-10-CM

## 2021-04-12 DIAGNOSIS — R11.2: ICD-10-CM

## 2021-04-12 DIAGNOSIS — R10.13: Primary | ICD-10-CM

## 2022-01-20 NOTE — ED PROVIDER NOTE - CPE EDP GASTRO NORM
Keysha Acevedo is calling to request a refill on the following medication(s):    Last Visit Date (If Applicable):  0/2/2999    Next Visit Date:    4/4/2022    Medication Request:  Requested Prescriptions     Pending Prescriptions Disp Refills    ALPRAZolam (XANAX) 1 MG tablet 30 tablet 0 normal...

## 2022-05-04 NOTE — ED ADULT NURSE NOTE - NS ED NURSE LEVEL OF CONSCIOUSNESS MENTAL STATUS
Pt reports left knee swelling for 2-3 days after tripping over a cat, then got up out of a chair and heard 3 pops and excrtciating pain since at 1230 today  
Cooperative/Awake/Alert

## 2023-01-17 NOTE — ED PROVIDER NOTE - AXIS
From: Rowan Morillo  To: Dianne Hull  Sent: 1/17/2023 12:35 PM CST  Subject: amlodipine & benazepril use    I continue to have issue with swelling in feet/legs. Im taking amlodipine in AM & also take water pill then. I take benazepril in evening. It seems that during the day while still having incontinence (frequent urges) less urine is produced. Swelling results so by end of day can be uncomfortable. Overnight I get up frequently (3-5 xs) & produce a lot of urine. By AM swelling is reduced. could I switch the time of taking the amlodipine & benazepril?   Normal